# Patient Record
Sex: FEMALE | Race: WHITE | NOT HISPANIC OR LATINO | Employment: FULL TIME | URBAN - METROPOLITAN AREA
[De-identification: names, ages, dates, MRNs, and addresses within clinical notes are randomized per-mention and may not be internally consistent; named-entity substitution may affect disease eponyms.]

---

## 2023-04-14 ENCOUNTER — APPOINTMENT (OUTPATIENT)
Dept: PRIMARY CARE | Facility: CLINIC | Age: 63
End: 2023-04-14
Payer: COMMERCIAL

## 2023-05-01 RX ORDER — CALCIUM CARBONATE/VITAMIN D3 600MG-5MCG
1 TABLET ORAL DAILY
COMMUNITY
Start: 2019-10-25

## 2023-05-01 RX ORDER — DICLOFENAC SODIUM 10 MG/G
GEL TOPICAL
COMMUNITY
Start: 2020-12-14 | End: 2023-05-02 | Stop reason: ALTCHOICE

## 2023-05-01 RX ORDER — AMITRIPTYLINE HYDROCHLORIDE 25 MG/1
1 TABLET, FILM COATED ORAL NIGHTLY
COMMUNITY
Start: 2022-01-06 | End: 2023-05-02 | Stop reason: SDUPTHER

## 2023-05-01 RX ORDER — CYCLOSPORINE 0.5 MG/ML
1 EMULSION OPHTHALMIC 2 TIMES DAILY
COMMUNITY
Start: 2019-01-29 | End: 2023-11-02 | Stop reason: WASHOUT

## 2023-05-01 RX ORDER — RAMIPRIL 10 MG/1
1 CAPSULE ORAL DAILY
COMMUNITY
Start: 2019-10-25 | End: 2023-05-02 | Stop reason: SDUPTHER

## 2023-05-01 RX ORDER — MULTIVITAMIN
1 TABLET ORAL DAILY
COMMUNITY
Start: 2019-10-25

## 2023-05-01 RX ORDER — MELOXICAM 15 MG/1
1 TABLET ORAL DAILY
COMMUNITY
Start: 2019-10-28 | End: 2023-05-02 | Stop reason: SDUPTHER

## 2023-05-01 RX ORDER — PAROXETINE HYDROCHLORIDE 20 MG/1
1 TABLET, FILM COATED ORAL DAILY
COMMUNITY
Start: 2019-10-25 | End: 2023-12-04 | Stop reason: SINTOL

## 2023-05-02 ENCOUNTER — OFFICE VISIT (OUTPATIENT)
Dept: PRIMARY CARE | Facility: CLINIC | Age: 63
End: 2023-05-02
Payer: COMMERCIAL

## 2023-05-02 VITALS
SYSTOLIC BLOOD PRESSURE: 118 MMHG | OXYGEN SATURATION: 96 % | WEIGHT: 181.4 LBS | HEIGHT: 61 IN | BODY MASS INDEX: 34.25 KG/M2 | TEMPERATURE: 97.2 F | HEART RATE: 79 BPM | DIASTOLIC BLOOD PRESSURE: 60 MMHG

## 2023-05-02 DIAGNOSIS — M54.16 LUMBAR RADICULOPATHY: ICD-10-CM

## 2023-05-02 DIAGNOSIS — M19.039 LOCALIZED PRIMARY OSTEOARTHRITIS OF WRIST, UNSPECIFIED LATERALITY: ICD-10-CM

## 2023-05-02 DIAGNOSIS — I10 ESSENTIAL HYPERTENSION, BENIGN: Primary | ICD-10-CM

## 2023-05-02 PROBLEM — M51.9 LUMBAR DISC DISEASE: Status: ACTIVE | Noted: 2023-05-02

## 2023-05-02 PROBLEM — H61.23 IMPACTED CERUMEN OF BOTH EARS: Status: ACTIVE | Noted: 2023-05-02

## 2023-05-02 PROBLEM — G56.03 BILATERAL CARPAL TUNNEL SYNDROME: Status: ACTIVE | Noted: 2023-05-02

## 2023-05-02 PROBLEM — M48.061 LUMBAR SPINAL STENOSIS: Status: ACTIVE | Noted: 2023-05-02

## 2023-05-02 PROCEDURE — 3078F DIAST BP <80 MM HG: CPT | Performed by: INTERNAL MEDICINE

## 2023-05-02 PROCEDURE — 99214 OFFICE O/P EST MOD 30 MIN: CPT | Performed by: INTERNAL MEDICINE

## 2023-05-02 PROCEDURE — 1036F TOBACCO NON-USER: CPT | Performed by: INTERNAL MEDICINE

## 2023-05-02 PROCEDURE — 3074F SYST BP LT 130 MM HG: CPT | Performed by: INTERNAL MEDICINE

## 2023-05-02 RX ORDER — AMITRIPTYLINE HYDROCHLORIDE 25 MG/1
25 TABLET, FILM COATED ORAL NIGHTLY
Qty: 90 TABLET | Refills: 1 | Status: SHIPPED | OUTPATIENT
Start: 2023-05-02 | End: 2024-05-02 | Stop reason: ALTCHOICE

## 2023-05-02 RX ORDER — RAMIPRIL 10 MG/1
10 CAPSULE ORAL DAILY
Qty: 90 CAPSULE | Refills: 1 | Status: SHIPPED | OUTPATIENT
Start: 2023-05-02 | End: 2023-11-02 | Stop reason: SDUPTHER

## 2023-05-02 RX ORDER — MELOXICAM 15 MG/1
15 TABLET ORAL DAILY
Qty: 90 TABLET | Refills: 1 | Status: SHIPPED | OUTPATIENT
Start: 2023-05-02 | End: 2023-11-02 | Stop reason: SDUPTHER

## 2023-05-02 ASSESSMENT — ENCOUNTER SYMPTOMS
ALLERGIC/IMMUNOLOGIC NEGATIVE: 1
CARDIOVASCULAR NEGATIVE: 1
RESPIRATORY NEGATIVE: 1
MUSCULOSKELETAL NEGATIVE: 1
CONSTITUTIONAL NEGATIVE: 1
GASTROINTESTINAL NEGATIVE: 1
HEMATOLOGIC/LYMPHATIC NEGATIVE: 1
PSYCHIATRIC NEGATIVE: 1
EYES NEGATIVE: 1
ENDOCRINE NEGATIVE: 1
NEUROLOGICAL NEGATIVE: 1

## 2023-05-02 NOTE — PROGRESS NOTES
"Subjective   Patient ID: Janet Santillan is a 62 y.o. female who presents for 6 month follow up .  Patient presents in follow up regarding hypertension.  Blood pressure has been well controlled on current therapy.  No adverse effects of medication reported.  Patient denies chest pain, palpitations, shortness of breath, orthopnea, fatigue or edema.         Review of Systems   Constitutional: Negative.    HENT: Negative.     Eyes: Negative.    Respiratory: Negative.     Cardiovascular: Negative.    Gastrointestinal: Negative.    Endocrine: Negative.    Genitourinary: Negative.    Musculoskeletal: Negative.    Skin: Negative.    Allergic/Immunologic: Negative.    Neurological: Negative.    Hematological: Negative.    Psychiatric/Behavioral: Negative.         Objective     Blood pressure 118/60, pulse 79, temperature 36.2 °C (97.2 °F), temperature source Temporal, height 1.549 m (5' 1\"), weight 82.3 kg (181 lb 6.4 oz), SpO2 96 %.   Physical Exam  Constitutional:       Appearance: Normal appearance.   Neck:      Vascular: No carotid bruit.   Cardiovascular:      Rate and Rhythm: Normal rate and regular rhythm.      Pulses: Normal pulses.      Heart sounds: Normal heart sounds. No murmur heard.  Pulmonary:      Effort: Pulmonary effort is normal.      Breath sounds: Normal breath sounds. No wheezing or rales.   Abdominal:      General: Abdomen is flat. There is no distension.      Palpations: Abdomen is soft.      Tenderness: There is no abdominal tenderness.   Musculoskeletal:         General: Normal range of motion.      Cervical back: Normal range of motion and neck supple.   Skin:     General: Skin is warm and dry.   Neurological:      General: No focal deficit present.      Mental Status: She is alert and oriented to person, place, and time.   Psychiatric:         Mood and Affect: Mood normal.         Behavior: Behavior normal.         Assessment/Plan   Problem List Items Addressed This Visit          Nervous    Lumbar " radiculopathy       Circulatory    Essential hypertension, benign - Primary       Musculoskeletal    Localized primary osteoarthritis of wrist     BP well controlled on current therapy.  Will continue present therapy and follow.  Arthritis pain well compensated on current therapy.  Will continue present therapy and follow.  It is noted that she underwent successful right carpal tunnel release on 1/24 and left carpal tunnel release on 2/14 per Dr. Baer and is doing well.  Radiculopathy Sx overall well compensated on current therapy.  Will continue present therapy and follow.  Lab done in 10/2022 is reviewed with no unsettling findings.  Will follow annually.  She also had mammogram in 11/2022 which was negative and DEXA scan which was normal.  She also had her colonoscopy done with finding of only 1 hyperplastic polyp.    Follow up in 6 months

## 2023-08-09 ENCOUNTER — TELEPHONE (OUTPATIENT)
Dept: PRIMARY CARE | Facility: CLINIC | Age: 63
End: 2023-08-09
Payer: COMMERCIAL

## 2023-08-09 NOTE — TELEPHONE ENCOUNTER
Patient called in and said she was experiencing hot flashes and she believes that the amitriptyline may be the cause of it. She was wondering if you would advise that she discontinue this medication or if she should take something else that is comparable as [facility] also has this medication on backorder.

## 2023-10-20 ENCOUNTER — TELEPHONE (OUTPATIENT)
Dept: PRIMARY CARE | Facility: CLINIC | Age: 63
End: 2023-10-20
Payer: COMMERCIAL

## 2023-10-20 DIAGNOSIS — Z13.6 SCREENING FOR CARDIOVASCULAR CONDITION: ICD-10-CM

## 2023-10-20 DIAGNOSIS — I10 ESSENTIAL HYPERTENSION, BENIGN: Primary | ICD-10-CM

## 2023-10-27 ENCOUNTER — LAB (OUTPATIENT)
Dept: LAB | Facility: LAB | Age: 63
End: 2023-10-27
Payer: COMMERCIAL

## 2023-10-27 DIAGNOSIS — Z13.6 SCREENING FOR CARDIOVASCULAR CONDITION: ICD-10-CM

## 2023-10-27 DIAGNOSIS — I10 ESSENTIAL HYPERTENSION, BENIGN: ICD-10-CM

## 2023-10-27 LAB
ALBUMIN SERPL BCP-MCNC: 4.3 G/DL (ref 3.4–5)
ALP SERPL-CCNC: 38 U/L (ref 33–136)
ALT SERPL W P-5'-P-CCNC: 12 U/L (ref 7–45)
ANION GAP SERPL CALC-SCNC: 9 MMOL/L (ref 10–20)
AST SERPL W P-5'-P-CCNC: 17 U/L (ref 9–39)
BASOPHILS # BLD AUTO: 0.04 X10*3/UL (ref 0–0.1)
BASOPHILS NFR BLD AUTO: 0.5 %
BILIRUB SERPL-MCNC: 0.3 MG/DL (ref 0–1.2)
BUN SERPL-MCNC: 27 MG/DL (ref 6–23)
CALCIUM SERPL-MCNC: 9.3 MG/DL (ref 8.6–10.3)
CHLORIDE SERPL-SCNC: 106 MMOL/L (ref 98–107)
CHOLEST SERPL-MCNC: 225 MG/DL (ref 0–199)
CHOLESTEROL/HDL RATIO: 5.2
CO2 SERPL-SCNC: 29 MMOL/L (ref 21–32)
CREAT SERPL-MCNC: 0.78 MG/DL (ref 0.5–1.05)
EOSINOPHIL # BLD AUTO: 0.19 X10*3/UL (ref 0–0.7)
EOSINOPHIL NFR BLD AUTO: 2.3 %
ERYTHROCYTE [DISTWIDTH] IN BLOOD BY AUTOMATED COUNT: 13.1 % (ref 11.5–14.5)
GFR SERPL CREATININE-BSD FRML MDRD: 85 ML/MIN/1.73M*2
GLUCOSE SERPL-MCNC: 97 MG/DL (ref 74–99)
HCT VFR BLD AUTO: 42.3 % (ref 36–46)
HDLC SERPL-MCNC: 43.4 MG/DL
HGB BLD-MCNC: 13.5 G/DL (ref 12–16)
IMM GRANULOCYTES # BLD AUTO: 0.03 X10*3/UL (ref 0–0.7)
IMM GRANULOCYTES NFR BLD AUTO: 0.4 % (ref 0–0.9)
LDLC SERPL CALC-MCNC: 121 MG/DL
LYMPHOCYTES # BLD AUTO: 3.75 X10*3/UL (ref 1.2–4.8)
LYMPHOCYTES NFR BLD AUTO: 45.3 %
MCH RBC QN AUTO: 27.4 PG (ref 26–34)
MCHC RBC AUTO-ENTMCNC: 31.9 G/DL (ref 32–36)
MCV RBC AUTO: 86 FL (ref 80–100)
MONOCYTES # BLD AUTO: 0.63 X10*3/UL (ref 0.1–1)
MONOCYTES NFR BLD AUTO: 7.6 %
NEUTROPHILS # BLD AUTO: 3.64 X10*3/UL (ref 1.2–7.7)
NEUTROPHILS NFR BLD AUTO: 43.9 %
NON HDL CHOLESTEROL: 182 MG/DL (ref 0–149)
NRBC BLD-RTO: 0 /100 WBCS (ref 0–0)
PLATELET # BLD AUTO: 210 X10*3/UL (ref 150–450)
PMV BLD AUTO: 10.6 FL (ref 7.5–11.5)
POTASSIUM SERPL-SCNC: 4.3 MMOL/L (ref 3.5–5.3)
PROT SERPL-MCNC: 6.8 G/DL (ref 6.4–8.2)
RBC # BLD AUTO: 4.92 X10*6/UL (ref 4–5.2)
SODIUM SERPL-SCNC: 140 MMOL/L (ref 136–145)
TRIGL SERPL-MCNC: 304 MG/DL (ref 0–149)
VLDL: 61 MG/DL (ref 0–40)
WBC # BLD AUTO: 8.3 X10*3/UL (ref 4.4–11.3)

## 2023-10-27 PROCEDURE — 85025 COMPLETE CBC W/AUTO DIFF WBC: CPT

## 2023-10-27 PROCEDURE — 36415 COLL VENOUS BLD VENIPUNCTURE: CPT

## 2023-10-27 PROCEDURE — 80061 LIPID PANEL: CPT

## 2023-10-27 PROCEDURE — 80053 COMPREHEN METABOLIC PANEL: CPT

## 2023-11-02 ENCOUNTER — OFFICE VISIT (OUTPATIENT)
Dept: PRIMARY CARE | Facility: CLINIC | Age: 63
End: 2023-11-02
Payer: COMMERCIAL

## 2023-11-02 VITALS
TEMPERATURE: 96.9 F | HEIGHT: 61 IN | SYSTOLIC BLOOD PRESSURE: 120 MMHG | DIASTOLIC BLOOD PRESSURE: 62 MMHG | HEART RATE: 77 BPM | OXYGEN SATURATION: 95 % | WEIGHT: 184.6 LBS | BODY MASS INDEX: 34.85 KG/M2

## 2023-11-02 DIAGNOSIS — M19.039 LOCALIZED PRIMARY OSTEOARTHRITIS OF WRIST, UNSPECIFIED LATERALITY: ICD-10-CM

## 2023-11-02 DIAGNOSIS — Z12.31 SCREENING MAMMOGRAM FOR BREAST CANCER: ICD-10-CM

## 2023-11-02 DIAGNOSIS — E66.09 CLASS 1 OBESITY DUE TO EXCESS CALORIES WITH SERIOUS COMORBIDITY AND BODY MASS INDEX (BMI) OF 34.0 TO 34.9 IN ADULT: ICD-10-CM

## 2023-11-02 DIAGNOSIS — I10 ESSENTIAL HYPERTENSION, BENIGN: Primary | ICD-10-CM

## 2023-11-02 DIAGNOSIS — M54.16 LUMBAR RADICULOPATHY: ICD-10-CM

## 2023-11-02 PROCEDURE — 3074F SYST BP LT 130 MM HG: CPT | Performed by: INTERNAL MEDICINE

## 2023-11-02 PROCEDURE — 99214 OFFICE O/P EST MOD 30 MIN: CPT | Performed by: INTERNAL MEDICINE

## 2023-11-02 PROCEDURE — 3008F BODY MASS INDEX DOCD: CPT | Performed by: INTERNAL MEDICINE

## 2023-11-02 PROCEDURE — 3078F DIAST BP <80 MM HG: CPT | Performed by: INTERNAL MEDICINE

## 2023-11-02 PROCEDURE — 1036F TOBACCO NON-USER: CPT | Performed by: INTERNAL MEDICINE

## 2023-11-02 RX ORDER — RAMIPRIL 10 MG/1
10 CAPSULE ORAL DAILY
Qty: 90 CAPSULE | Refills: 1 | Status: SHIPPED | OUTPATIENT
Start: 2023-11-02 | End: 2024-05-02 | Stop reason: SDUPTHER

## 2023-11-02 RX ORDER — MELOXICAM 15 MG/1
15 TABLET ORAL DAILY
Qty: 90 TABLET | Refills: 1 | Status: SHIPPED | OUTPATIENT
Start: 2023-11-02 | End: 2024-05-02 | Stop reason: SDUPTHER

## 2023-11-02 ASSESSMENT — ENCOUNTER SYMPTOMS
RESPIRATORY NEGATIVE: 1
ENDOCRINE NEGATIVE: 1
MUSCULOSKELETAL NEGATIVE: 1
NEUROLOGICAL NEGATIVE: 1
EYES NEGATIVE: 1
PSYCHIATRIC NEGATIVE: 1
CONSTITUTIONAL NEGATIVE: 1
HEMATOLOGIC/LYMPHATIC NEGATIVE: 1
CARDIOVASCULAR NEGATIVE: 1
GASTROINTESTINAL NEGATIVE: 1
ALLERGIC/IMMUNOLOGIC NEGATIVE: 1

## 2023-11-02 NOTE — PROGRESS NOTES
"Subjective   Patient ID: Janet Santillan is a 63 y.o. female who presents for 6 month follow up .  Patient presents in follow up regarding hypertension.  Blood pressure has been well controlled on current therapy.  No adverse effects of medication reported.  Patient denies chest pain, palpitations, shortness of breath, orthopnea, fatigue or edema.         Review of Systems   Constitutional: Negative.    HENT: Negative.     Eyes: Negative.    Respiratory: Negative.     Cardiovascular: Negative.    Gastrointestinal: Negative.    Endocrine: Negative.    Genitourinary: Negative.    Musculoskeletal: Negative.    Skin: Negative.    Allergic/Immunologic: Negative.    Neurological: Negative.    Hematological: Negative.    Psychiatric/Behavioral: Negative.         Objective     Blood pressure 120/62, pulse 77, temperature 36.1 °C (96.9 °F), temperature source Temporal, height 1.549 m (5' 1\"), weight 83.7 kg (184 lb 9.6 oz), SpO2 95 %.   Physical Exam  Constitutional:       Appearance: Normal appearance.   Neck:      Vascular: No carotid bruit.   Cardiovascular:      Rate and Rhythm: Normal rate and regular rhythm.      Pulses: Normal pulses.      Heart sounds: Normal heart sounds. No murmur heard.  Pulmonary:      Effort: Pulmonary effort is normal.      Breath sounds: Normal breath sounds. No wheezing or rales.   Abdominal:      General: Abdomen is flat. There is no distension.      Palpations: Abdomen is soft.      Tenderness: There is no abdominal tenderness.   Musculoskeletal:         General: Normal range of motion.      Cervical back: Normal range of motion and neck supple.   Skin:     General: Skin is warm and dry.   Neurological:      General: No focal deficit present.      Mental Status: She is alert and oriented to person, place, and time.   Psychiatric:         Mood and Affect: Mood normal.         Behavior: Behavior normal.         Assessment/Plan   Problem List Items Addressed This Visit       Essential " hypertension, benign - Primary    Relevant Medications    ramipril (Altace) 10 mg capsule    Localized primary osteoarthritis of wrist    Relevant Medications    meloxicam (Mobic) 15 mg tablet    Lumbar radiculopathy    Relevant Medications    meloxicam (Mobic) 15 mg tablet     Other Visit Diagnoses       Screening mammogram for breast cancer        Relevant Orders    BI mammo bilateral screening tomosynthesis    Class 1 obesity due to excess calories with serious comorbidity and body mass index (BMI) of 34.0 to 34.9 in adult              BP well controlled on current therapy.  Will continue present therapy and follow.  Arthritis pain well compensated on current therapy.  Will continue present therapy and follow.  It is noted that she underwent successful right carpal tunnel release on 1/24 and left carpal tunnel release on 2/14 per Dr. Baer and is doing well.  Radiculopathy Sx overall well compensated on current therapy.  She states she doesn't feel the amitriptyline or the paroxetine are necessary at this point for her hot flashes, so she is going to try to go off of them.  Refill not done today and will follow clinically.  It is noted that patient has gained 10# over the past 9 months.  She advised on improving dietary choices and portion control as well as increasing exercise to promote weight loss.   Lab recently done is reviewed with no unsettling findings.  Will follow annually.  She is due for annual mammogram this month.  Order is entered.  She has had colonoscopy done with finding of only 1 hyperplastic polyp.    Follow up in 6 months

## 2023-11-09 ENCOUNTER — HOSPITAL ENCOUNTER (OUTPATIENT)
Dept: RADIOLOGY | Facility: HOSPITAL | Age: 63
Discharge: HOME | End: 2023-11-09
Payer: COMMERCIAL

## 2023-11-09 DIAGNOSIS — Z12.31 SCREENING MAMMOGRAM FOR BREAST CANCER: ICD-10-CM

## 2023-11-09 PROCEDURE — 77067 SCR MAMMO BI INCL CAD: CPT | Performed by: RADIOLOGY

## 2023-11-09 PROCEDURE — 77063 BREAST TOMOSYNTHESIS BI: CPT

## 2023-11-09 PROCEDURE — 77063 BREAST TOMOSYNTHESIS BI: CPT | Performed by: RADIOLOGY

## 2023-12-04 ENCOUNTER — OFFICE VISIT (OUTPATIENT)
Dept: OBSTETRICS AND GYNECOLOGY | Facility: CLINIC | Age: 63
End: 2023-12-04
Payer: COMMERCIAL

## 2023-12-04 VITALS
HEIGHT: 61 IN | SYSTOLIC BLOOD PRESSURE: 144 MMHG | DIASTOLIC BLOOD PRESSURE: 88 MMHG | WEIGHT: 185 LBS | BODY MASS INDEX: 34.93 KG/M2

## 2023-12-04 DIAGNOSIS — Z01.419 WOMEN'S ANNUAL ROUTINE GYNECOLOGICAL EXAMINATION: Primary | ICD-10-CM

## 2023-12-04 PROCEDURE — 1036F TOBACCO NON-USER: CPT | Performed by: NURSE PRACTITIONER

## 2023-12-04 PROCEDURE — 3077F SYST BP >= 140 MM HG: CPT | Performed by: NURSE PRACTITIONER

## 2023-12-04 PROCEDURE — 3079F DIAST BP 80-89 MM HG: CPT | Performed by: NURSE PRACTITIONER

## 2023-12-04 PROCEDURE — 99396 PREV VISIT EST AGE 40-64: CPT | Performed by: NURSE PRACTITIONER

## 2023-12-04 PROCEDURE — 3008F BODY MASS INDEX DOCD: CPT | Performed by: NURSE PRACTITIONER

## 2023-12-04 ASSESSMENT — ENCOUNTER SYMPTOMS
RESPIRATORY NEGATIVE: 1
MUSCULOSKELETAL NEGATIVE: 1
CARDIOVASCULAR NEGATIVE: 1
EYES NEGATIVE: 1
PSYCHIATRIC NEGATIVE: 1
NEUROLOGICAL NEGATIVE: 1
CONSTITUTIONAL NEGATIVE: 1
GASTROINTESTINAL NEGATIVE: 1
ENDOCRINE NEGATIVE: 1

## 2024-05-02 ENCOUNTER — OFFICE VISIT (OUTPATIENT)
Dept: PRIMARY CARE | Facility: CLINIC | Age: 64
End: 2024-05-02
Payer: COMMERCIAL

## 2024-05-02 VITALS
DIASTOLIC BLOOD PRESSURE: 58 MMHG | OXYGEN SATURATION: 98 % | TEMPERATURE: 96.4 F | HEART RATE: 54 BPM | BODY MASS INDEX: 33.48 KG/M2 | WEIGHT: 177.2 LBS | SYSTOLIC BLOOD PRESSURE: 130 MMHG

## 2024-05-02 DIAGNOSIS — M19.039 LOCALIZED PRIMARY OSTEOARTHRITIS OF WRIST, UNSPECIFIED LATERALITY: ICD-10-CM

## 2024-05-02 DIAGNOSIS — M54.16 LUMBAR RADICULOPATHY: ICD-10-CM

## 2024-05-02 DIAGNOSIS — Z98.890 S/P LUMBAR DISCECTOMY: ICD-10-CM

## 2024-05-02 DIAGNOSIS — Z13.6 SCREENING FOR CARDIOVASCULAR CONDITION: ICD-10-CM

## 2024-05-02 DIAGNOSIS — I10 ESSENTIAL HYPERTENSION, BENIGN: Primary | ICD-10-CM

## 2024-05-02 PROCEDURE — 99214 OFFICE O/P EST MOD 30 MIN: CPT | Performed by: INTERNAL MEDICINE

## 2024-05-02 PROCEDURE — G2211 COMPLEX E/M VISIT ADD ON: HCPCS | Performed by: INTERNAL MEDICINE

## 2024-05-02 PROCEDURE — 3008F BODY MASS INDEX DOCD: CPT | Performed by: INTERNAL MEDICINE

## 2024-05-02 PROCEDURE — 3075F SYST BP GE 130 - 139MM HG: CPT | Performed by: INTERNAL MEDICINE

## 2024-05-02 PROCEDURE — 3078F DIAST BP <80 MM HG: CPT | Performed by: INTERNAL MEDICINE

## 2024-05-02 RX ORDER — RAMIPRIL 10 MG/1
10 CAPSULE ORAL DAILY
Qty: 90 CAPSULE | Refills: 1 | Status: SHIPPED | OUTPATIENT
Start: 2024-05-02

## 2024-05-02 RX ORDER — MELOXICAM 15 MG/1
15 TABLET ORAL DAILY
Qty: 90 TABLET | Refills: 1 | Status: SHIPPED | OUTPATIENT
Start: 2024-05-02

## 2024-05-02 RX ORDER — PREDNISONE 10 MG/1
TABLET ORAL DAILY
Qty: 19 TABLET | Refills: 0 | Status: SHIPPED | OUTPATIENT
Start: 2024-05-02 | End: 2024-05-13

## 2024-05-02 ASSESSMENT — ENCOUNTER SYMPTOMS
CONSTITUTIONAL NEGATIVE: 1
EYES NEGATIVE: 1
PSYCHIATRIC NEGATIVE: 1
NEUROLOGICAL NEGATIVE: 1
CARDIOVASCULAR NEGATIVE: 1
ENDOCRINE NEGATIVE: 1
ALLERGIC/IMMUNOLOGIC NEGATIVE: 1
GASTROINTESTINAL NEGATIVE: 1
RESPIRATORY NEGATIVE: 1
MUSCULOSKELETAL NEGATIVE: 1
HEMATOLOGIC/LYMPHATIC NEGATIVE: 1

## 2024-05-02 NOTE — PROGRESS NOTES
Subjective   Patient ID: Janet Santillan is a 63 y.o. female who presents for 6 month follow up .  Patient presents in follow up regarding hypertension.  Blood pressure has been well controlled on current therapy.  No adverse effects of medication reported.  Patient denies chest pain, palpitations, shortness of breath, orthopnea, fatigue or edema.         Review of Systems   Constitutional: Negative.    HENT: Negative.     Eyes: Negative.    Respiratory: Negative.     Cardiovascular: Negative.    Gastrointestinal: Negative.    Endocrine: Negative.    Genitourinary: Negative.    Musculoskeletal: Negative.    Skin: Negative.    Allergic/Immunologic: Negative.    Neurological: Negative.    Hematological: Negative.    Psychiatric/Behavioral: Negative.         Objective     Blood pressure 130/58, pulse 54, temperature 35.8 °C (96.4 °F), temperature source Temporal, weight 80.4 kg (177 lb 3.2 oz), SpO2 98%.   Physical Exam  Constitutional:       Appearance: Normal appearance.   Neck:      Vascular: No carotid bruit.   Cardiovascular:      Rate and Rhythm: Normal rate and regular rhythm.      Pulses: Normal pulses.      Heart sounds: Normal heart sounds. No murmur heard.  Pulmonary:      Effort: Pulmonary effort is normal.      Breath sounds: Normal breath sounds. No wheezing or rales.   Abdominal:      General: Abdomen is flat. There is no distension.      Palpations: Abdomen is soft.      Tenderness: There is no abdominal tenderness.   Musculoskeletal:         General: Normal range of motion.      Cervical back: Normal range of motion and neck supple.   Skin:     General: Skin is warm and dry.   Neurological:      General: No focal deficit present.      Mental Status: She is alert and oriented to person, place, and time.   Psychiatric:         Mood and Affect: Mood normal.         Behavior: Behavior normal.         Assessment/Plan   Problem List Items Addressed This Visit       Essential hypertension, benign - Primary     Relevant Medications    ramipril (Altace) 10 mg capsule    Other Relevant Orders    Comprehensive Metabolic Panel    CBC and Auto Differential    Localized primary osteoarthritis of wrist    Relevant Medications    meloxicam (Mobic) 15 mg tablet    Lumbar radiculopathy    Relevant Medications    meloxicam (Mobic) 15 mg tablet    predniSONE (Deltasone) 10 mg tablet    Other Relevant Orders    Referral to Orthopaedic Surgery     Other Visit Diagnoses       Screening for cardiovascular condition        Relevant Orders    Lipid Panel    S/P lumbar discectomy        Relevant Orders    Referral to Orthopaedic Surgery          BP well controlled on current therapy.  Will continue present therapy and follow.  Arthritis pain well compensated on current therapy.  Will continue present therapy and follow.  It is noted that she underwent successful right carpal tunnel release on 1/24 and left carpal tunnel release on 2/14 per Dr. Baer and is doing well.  Radiculopathy Sx in the right lower lateral leg and foot have been worsening of late.  She is remotely s/p right L5 hemilaminectomy and L4-5 discectomy.  Will attempt to reduce inflammation with steroid therapy and will refer to Dr. Elaine.  She has remote x-rrays and MR so will defer ordering any radiographs to Dr. Elaine.  She states she didn't feel the amitriptyline or the paroxetine were necessary at this point for her hot flashes, so she has stopped both over time and continues to do well without.  It is noted that patient has lost 8# over the past 6 months.  She is encouraged and advised to continue improving dietary choices and portion control as well as increasing exercise to promote weight loss.   Annual mammogram in November was negative.  Will follow annually.  She has had colonoscopy done with finding of only 1 hyperplastic polyp.  Annual screening lab will be ordered for prior to next OV.    Follow up in 6 months   Lab to be drawn 1 week prior to next office  visit.

## 2024-05-16 ENCOUNTER — TRANSCRIBE ORDERS (OUTPATIENT)
Dept: ORTHOPEDIC SURGERY | Facility: HOSPITAL | Age: 64
End: 2024-05-16
Payer: COMMERCIAL

## 2024-05-16 DIAGNOSIS — M54.50 LOW BACK PAIN, UNSPECIFIED BACK PAIN LATERALITY, UNSPECIFIED CHRONICITY, UNSPECIFIED WHETHER SCIATICA PRESENT: ICD-10-CM

## 2024-05-21 ENCOUNTER — OFFICE VISIT (OUTPATIENT)
Dept: ORTHOPEDIC SURGERY | Facility: CLINIC | Age: 64
End: 2024-05-21
Payer: COMMERCIAL

## 2024-05-21 VITALS — HEIGHT: 61 IN | BODY MASS INDEX: 33.42 KG/M2 | WEIGHT: 177 LBS

## 2024-05-21 DIAGNOSIS — Z98.890 S/P LUMBAR DISCECTOMY: ICD-10-CM

## 2024-05-21 DIAGNOSIS — M54.16 LUMBAR RADICULOPATHY: ICD-10-CM

## 2024-05-21 PROCEDURE — 99213 OFFICE O/P EST LOW 20 MIN: CPT | Performed by: ORTHOPAEDIC SURGERY

## 2024-05-21 PROCEDURE — 99203 OFFICE O/P NEW LOW 30 MIN: CPT | Performed by: ORTHOPAEDIC SURGERY

## 2024-05-21 PROCEDURE — 3008F BODY MASS INDEX DOCD: CPT | Performed by: ORTHOPAEDIC SURGERY

## 2024-05-21 ASSESSMENT — PAIN - FUNCTIONAL ASSESSMENT: PAIN_FUNCTIONAL_ASSESSMENT: 0-10

## 2024-05-21 NOTE — LETTER
May 22, 2024     Rylan Salcedo MD  [address]  [facility], [address] [place] [id number]    Patient: Janet Santillan   YOB: 1960   Date of Visit: 5/21/2024       Dear Dr. Rylan Salcedo MD:    Thank you for referring Janet Santillan to me for evaluation. Below are my notes for this consultation.  If you have questions, please do not hesitate to call me. I look forward to following your patient along with you.       Sincerely,     Mamadou Elaine MD      CC: No Recipients  ______________________________________________________________________________________    HPI:Janet Santillan is a 63-year-old woman who comes in today with complaints of chronic right-sided back pain and leg pain.  She describes it as a throbbing pain.  There is associated numbness and tingling.  She has not had physical therapy.  Pain is rather constant.      ROS:  Reviewed on EMR and patient intake sheet.    PMH/SH:  Reviewed on EMR and patient intake sheet.    Exam:  Physical Exam    Constitutional: Well appearing; no acute distress  Eyes: pupils are equal and round  Psych: normal affect  Respiratory: non-labored breathing  Cardiovascular: regular rate and rhythm  GI: non-distended abdomen  Musculoskeletal: no pain with range of motion of the hips bilaterally  Neurologic: [4]/5 strength in the lower extremities bilaterally]; [negative] straight leg raise    Radiology:     MRI in 2021 demonstrates disc bulging L4-5 L5-S1.  Evidence of prior right 5 S1 hemilaminectomy is noted.    Diagnosis:    Lumbar radiculopathy    Assessment and Plan:   63-year-old woman with lumbar radiculopathy.  Will begin with physical therapy.  If the symptoms do not improve, then she would need an MRI and follow-up.    The patient was in agreement with the plan. At the end of the visit today, the patient felt that all questions had been answered satisfactorily.  The patient was pleased with the visit and very appreciative for the  care rendered.     Thank you very much for the kind referral.  It is a privilege, and a pleasure, to partner with you in the care of your patients.  I would be delighted to assist you with any further consultations as needed.          Mamadou Elaine MD    Chief of Spine Surgery, [facility]  Director of Spine Service, [facility]  , Department of Orthopaedics  [employer]  [address] [id number]  P: [phone]  [facility]Lost My Name    This note was dictated with voice recognition software.  It has not been proofread for grammatical errors, typographical mistakes or other semantic inconsistencies.

## 2024-05-22 NOTE — PROGRESS NOTES
HPI:Janet Santillan is a 63-year-old woman who comes in today with complaints of chronic right-sided back pain and leg pain.  She describes it as a throbbing pain.  There is associated numbness and tingling.  She has not had physical therapy.  Pain is rather constant.      ROS:  Reviewed on EMR and patient intake sheet.    PMH/SH:  Reviewed on EMR and patient intake sheet.    Exam:  Physical Exam    Constitutional: Well appearing; no acute distress  Eyes: pupils are equal and round  Psych: normal affect  Respiratory: non-labored breathing  Cardiovascular: regular rate and rhythm  GI: non-distended abdomen  Musculoskeletal: no pain with range of motion of the hips bilaterally  Neurologic: [4]/5 strength in the lower extremities bilaterally]; [negative] straight leg raise    Radiology:     MRI in 2021 demonstrates disc bulging L4-5 L5-S1.  Evidence of prior right 5 S1 hemilaminectomy is noted.    Diagnosis:    Lumbar radiculopathy    Assessment and Plan:   63-year-old woman with lumbar radiculopathy.  Will begin with physical therapy.  If the symptoms do not improve, then she would need an MRI and follow-up.    The patient was in agreement with the plan. At the end of the visit today, the patient felt that all questions had been answered satisfactorily.  The patient was pleased with the visit and very appreciative for the care rendered.     Thank you very much for the kind referral.  It is a privilege, and a pleasure, to partner with you in the care of your patients.  I would be delighted to assist you with any further consultations as needed.          Mamadou Elaine MD    Chief of Spine Surgery, [facility]  Director of Spine Service, [facility]  , Department of Orthopaedics  [facility] [employer]  [address] [place] [address]  P: [phone]  [facility]    This note was dictated with  voice recognition software.  It has not been proofread for grammatical errors, typographical mistakes or other semantic inconsistencies.

## 2024-06-04 ENCOUNTER — EVALUATION (OUTPATIENT)
Dept: PHYSICAL THERAPY | Facility: HOSPITAL | Age: 64
End: 2024-06-04
Payer: COMMERCIAL

## 2024-06-04 DIAGNOSIS — R29.898 WEAKNESS OF RIGHT LEG: ICD-10-CM

## 2024-06-04 DIAGNOSIS — M54.16 LUMBAR RADICULOPATHY: Primary | ICD-10-CM

## 2024-06-04 DIAGNOSIS — M62.81 TRUNCAL MUSCLE WEAKNESS: ICD-10-CM

## 2024-06-04 PROCEDURE — 97162 PT EVAL MOD COMPLEX 30 MIN: CPT | Mod: GP | Performed by: PHYSICAL THERAPIST

## 2024-06-04 PROCEDURE — 97110 THERAPEUTIC EXERCISES: CPT | Mod: GP | Performed by: PHYSICAL THERAPIST

## 2024-06-04 ASSESSMENT — PAIN SCALES - GENERAL: PAINLEVEL_OUTOF10: 3

## 2024-06-04 ASSESSMENT — ENCOUNTER SYMPTOMS
LOSS OF SENSATION IN FEET: 1
OCCASIONAL FEELINGS OF UNSTEADINESS: 0
DEPRESSION: 0

## 2024-06-04 ASSESSMENT — PAIN DESCRIPTION - DESCRIPTORS: DESCRIPTORS: CRAMPING;SHOOTING;SPASM

## 2024-06-04 ASSESSMENT — PAIN - FUNCTIONAL ASSESSMENT: PAIN_FUNCTIONAL_ASSESSMENT: 0-10

## 2024-06-04 NOTE — PROGRESS NOTES
Physical Therapy    Physical Therapy Evaluation and Treatment      Patient Name: Janet Santillan  MRN: [id number]  Today's Date: 6/4/2024  Time Calculation  Start Time: 0900  Stop Time: 0950  Time Calculation (min): 50 min  Visit #1 ( auth requested)    Assessment:      Lumbar radiculopathy  Rt LE, constant 3/10 , ranges to 8/10 . Rt lower leg , cramping in toes, LBP Rt.  Chronic 2020, hx of spine surgery 2011,  hopes to get MRI, needs to do PT first. Works 4  12 hour days and so has only 1 day a week available to schedule PT. Some fear of falling , no fallls, the Rt leg is weak and cramps up, numb toes. Pt has core weakness and does not know how to engage abdominals, she has Rt Leg weakness and ankle and toe weakness, gait abnormality .       Plan:  OP PT Plan  Treatment/Interventions: Cryotherapy, Education/ Instruction, Manual therapy, Neuromuscular re-education, Therapeutic activities, Therapeutic exercises  PT Plan: Skilled PT  PT Frequency: 1 time per week (pt needs 1 x week due to her work schedule)  Duration: 6 weeks  Onset Date: 01/01/24 (chronic progressive since 2020)  Certification Period Start Date: 06/04/24  Certification Period End Date: 09/04/24  Number of Treatments Authorized: auth requested  Rehab Potential: Fair  Plan of Care Agreement: Patient    Current Problem:   1. Lumbar radiculopathy  Referral to Physical Therapy      2. Weakness of right leg        3. Truncal muscle weakness            Subjective   Lumbar radiculopathy  Rt LE, constant 3/10 , ranges to 8/10 . Rt lower leg , cramping in toes, LBP Rt.  Chronic 2020, hx of spine surgery 2011,  hopes to get MRI, needs to do PT first. Works 4  12 hour days and so has only 1 day a week available to schedule PT. Some fear of falling , no fallls, the Rt leg is weak and cramps up, numb toes    General:  General  Reason for Referral: Chele  Referred By: Lumbar radiculopathy  Past Medical History Relevant to Rehab: hx of 2011 lumbar spine surgery,  since that time 2021 shows disc bulging L4-5, L5-S1  Precautions:  Precautions  STEADI Fall Risk Score (The score of 4 or more indicates an increased risk of falling): 2 ( no falls but fears fall due to  rt leg weakness and pain, numbness       Pain:  Pain Assessment  Pain Assessment: 0-10  Pain Score: 3 (3-8/10)  Pain Type: Chronic pain  Pain Location: Back  Pain Orientation: Right  Pain Radiating Towards: rt foot / toes and lower leg rt.  Pain Descriptors: Cramping, Shooting, Spasm  Pain Frequency: Constant/continuous (cramping at night. numbness constant.)  Pain Onset: Progressive (2020)  Clinical Progression: Gradually worsening          Objective        General Assessments:  Janet Santillan is a 63-year-old woman who comes in today with complaints of chronic right-sided back pain and leg pain.  She describes it as a throbbing pain.  There is associated numbness and tingling.  Pain is rather constant. Main complaint is Rt lower leg cramping and spams across ankle and into toes. Numbness in Rt foot toes.  Pain since 2020.  Hopes to have MRI but needs PT first per insuranance    MRI in 2021 demonstrates disc bulging L4-5 L5-S1.  Evidence of prior right 5 S1 hemilaminectomy is noted.  Hx of Lumbar surgery 2011 . 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    Extremity/Trunk Assessments:    Rt LE weakness     MMT  Hip flex Rt 4-/5   left 5/5  Knee ext Rt 3/5  left 5/5  Knee flex Rt 4-/5   left 5/5  Ankle DF Rt 3/5   left 5/5  Ankle IN / EV Rt 3/5  left 5/5  Toe weakness 3/5 Rt left 5/5    Trunk AROM   Flex reach knees ( pain)  Ext 10' past neutral  Side bend left pain and limited  Side bend Rt WNL  Figure 4 tight Rt hip     +TTP Rt Low back - mid back   advise to ice     HS Rt 55    Gait antalgic, decreased wt bear time Rt  LE    Bed mobility with difficulty, pain , does not  know Log roll technique  Prone 30 sec but could not tolerate, increased cramping in foot / toes    Pt works 4 12 hour days and coming to PT will need to be on her day off 1 x in the week        Outcome Measures:  Oswestry 24%       Treatments:  EXERCISES       Date       VISIT# # # # #    REPS REPS REPS REPS          Core abdominal training  Pelvic tilt hooklying  Pelvic tilt with movement of LE  Pelvic rocking in seated  Pelvic tilt seated  Standing and engage core with BTW              LE ex standing with core engaged              Scapular rows  Pull downs              Prone lying                             Log roll bed mobility training              Posture educate       HEP  (    Build)          Educate pt in core engagement of abdominals, needs cues  Was able to learn supine but difficulty in seated , attempt seated pelvic rocking  unable to engage core in  standing    Educate in prone lying position for 30 sec but could not tolerate    Access Code: [email or web address]  URL: [email or web address]  Date: 06/04/2024  Prepared by: Kaia Chester    Exercises  - Supine Posterior Pelvic Tilt  - 1 x daily - 7 x weekly - 10 reps - 5 hold    EDUCATION:  Outpatient Education  Individual(s) Educated: Patient  Education Provided: POC, Home Exercise Program, Posture  Risk and Benefits Discussed with Patient/Caregiver/Other: yes  Plan of Care Discussed and Agreed Upon: yes  Patient Response to Education: Patient/Caregiver Verbalized Understanding of Information    Goals:  Active       PT Problem       PT Goal        Start:  06/04/24    Expected End:  06/25/24       1 Patient to be independent with home exercises within pain free range to stretch Lower extremities and to strengthen core abdominals and core stabilization to increase mobility  2 Patient to demonstrate understanding of what it means to have neutral posture alignment, the use of positioning strategies and body mechanics principles to reduce pain with  everyday activities.  3 Patient to gain the functional range of motion necessary in the  lumbar spine to perform activities of daily living with increased ease.              PT Goal        Start:  06/04/24    Expected End:  07/16/24       1 Patient to have reduced pain by 50% or more for increased function and mobility  2 Patient to demonstrate 1/3 MMT strength gain or more in targeted muscle groups and core strength for increased overall mobility  3 Patient to demonstrate a 4 point or more change in Oswestry to indicate reduced impairment with every day living   4 Pt to ambulate with normal gait, no longer antalgic, and for confidence walking to increase as it relates to Rt leg

## 2024-06-11 ENCOUNTER — APPOINTMENT (OUTPATIENT)
Dept: PHYSICAL THERAPY | Facility: HOSPITAL | Age: 64
End: 2024-06-11
Payer: COMMERCIAL

## 2024-06-12 ENCOUNTER — TREATMENT (OUTPATIENT)
Dept: PHYSICAL THERAPY | Facility: HOSPITAL | Age: 64
End: 2024-06-12
Payer: COMMERCIAL

## 2024-06-12 DIAGNOSIS — M54.16 LUMBAR RADICULOPATHY: ICD-10-CM

## 2024-06-12 DIAGNOSIS — R29.898 WEAKNESS OF RIGHT LEG: ICD-10-CM

## 2024-06-12 DIAGNOSIS — M62.81 TRUNCAL MUSCLE WEAKNESS: ICD-10-CM

## 2024-06-12 PROCEDURE — 97110 THERAPEUTIC EXERCISES: CPT | Mod: GP,CQ

## 2024-06-12 ASSESSMENT — PAIN SCALES - GENERAL: PAINLEVEL_OUTOF10: 3

## 2024-06-12 ASSESSMENT — PAIN - FUNCTIONAL ASSESSMENT: PAIN_FUNCTIONAL_ASSESSMENT: 0-10

## 2024-06-12 NOTE — PROGRESS NOTES
Physical Therapy    Physical Therapy Treatment    Patient Name: Janet Santillan  MRN: [id number]  Today's Date: 6/12/2024  Time Calculation  Start Time: 0745  Stop Time: 0830  Time Calculation (min): 45 min    PT Therapeutic Procedures Time Entry  Therapeutic Exercise Time Entry: 45          VISIT:# 2  1 EVAL AND 20 VISITS 6/4 TO 9/30    Current Problem  1. Lumbar radiculopathy  Follow Up In Physical Therapy      2. Weakness of right leg  Follow Up In Physical Therapy      3. Truncal muscle weakness  Follow Up In Physical Therapy          Subjective   Pt reports her leg is a little sore from doing the butt exercises        Precautions  Precautions  STEADI Fall Risk Score (The score of 4 or more indicates an increased risk of falling): 2  Precautions Comment: none       Pain  Pain Assessment: 0-10  Pain Score: 3  Pain Type: Chronic pain  Pain Location: Back       Objective    Started land based therapy         Treatments:  EXERCISES        Date        VISIT#  #2/20 # # #     REPS REPS REPS REPS   Nustep  10' @L1      Core abdominal training  Pelvic tilt hooklying  Pelvic tilt with movement of LE  Pelvic rocking in seated  Pelvic tilt seated  Standing and engage core with BTW            10x              LE ex standing with core engaged NEW   3 way kick 2 x 10 ea      HS curls  2 x 10       Scapular rows  Pull downs  2 x 10 red  2 x 10 red              Prone lying   PPU  X  5x                              Log roll bed mobility training                Posture educate        HEP (    Build)             Assessment:   Prone lying and PPU did not effect the pain n/t in pt's leg and foot.  Pt was educated on how to engage her core muscles.         Plan: Pt will continue her exercises at home.  We will advance as we are able   OP PT Plan  Treatment/Interventions: Cryotherapy, Education/ Instruction, Manual therapy, Neuromuscular re-education, Therapeutic activities, Therapeutic exercises  PT Plan: Skilled PT  PT Frequency: 1 time  per week (pt needs 1 x week due to her work schedule)  Duration: 6 weeks  Onset Date: 01/01/24 (chronic progressive since 2020)  Certification Period Start Date: 06/04/24  Certification Period End Date: 09/04/24  Number of Treatments Authorized: auth requested  Rehab Potential: Fair  Plan of Care Agreement: Patient    Goals:  Active       PT Problem       PT Goal        Start:  06/04/24    Expected End:  06/25/24       1 Patient to be independent with home exercises within pain free range to stretch Lower extremities and to strengthen core abdominals and core stabilization to increase mobility  2 Patient to demonstrate understanding of what it means to have neutral posture alignment, the use of positioning strategies and body mechanics principles to reduce pain with everyday activities.  3 Patient to gain the functional range of motion necessary in the  lumbar spine to perform activities of daily living with increased ease.              PT Goal        Start:  06/04/24    Expected End:  07/16/24       1 Patient to have reduced pain by 50% or more for increased function and mobility  2 Patient to demonstrate 1/3 MMT strength gain or more in targeted muscle groups and core strength for increased overall mobility  3 Patient to demonstrate a 4 point or more change in Oswestry to indicate reduced impairment with every day living   4 Pt to ambulate with normal gait, no longer antalgic, and for confidence walking to increase as it relates to Rt leg

## 2024-06-14 ENCOUNTER — TREATMENT (OUTPATIENT)
Dept: PHYSICAL THERAPY | Facility: HOSPITAL | Age: 64
End: 2024-06-14
Payer: COMMERCIAL

## 2024-06-14 DIAGNOSIS — M54.16 LUMBAR RADICULOPATHY: ICD-10-CM

## 2024-06-14 DIAGNOSIS — M62.81 TRUNCAL MUSCLE WEAKNESS: ICD-10-CM

## 2024-06-14 DIAGNOSIS — R29.898 WEAKNESS OF RIGHT LEG: ICD-10-CM

## 2024-06-14 PROCEDURE — 97110 THERAPEUTIC EXERCISES: CPT | Mod: GP | Performed by: PHYSICAL THERAPIST

## 2024-06-14 ASSESSMENT — PAIN - FUNCTIONAL ASSESSMENT: PAIN_FUNCTIONAL_ASSESSMENT: 0-10

## 2024-06-14 ASSESSMENT — PAIN DESCRIPTION - DESCRIPTORS: DESCRIPTORS: CRAMPING;SHOOTING

## 2024-06-14 ASSESSMENT — PAIN SCALES - GENERAL: PAINLEVEL_OUTOF10: 3

## 2024-06-14 NOTE — PROGRESS NOTES
Physical Therapy    Physical Therapy    Physical Therapy Treatment      Patient Name: Janet Santillan  MRN: [id number]  Today's Date: 6/14/2024  Visit # 3  1 EVAL AND 20 VISITS 6/4 TO 9/30   Time Calculation  Start Time: 1015  Stop Time: 1100  Time Calculation (min): 45 min      Subjective  continues to have symptoms, has to do PT for MRI, still working 4  12 hour days  And does not feel she can keep up with this physical job         Precautions  Precautions  STEADI Fall Risk Score (The score of 4 or more indicates an increased risk of falling): 2 ( no falls but fear falling due to rt leg weakness/ pain)    Current Problem:   1. Lumbar radiculopathy  Follow Up In Physical Therapy      2. Weakness of right leg  Follow Up In Physical Therapy      3. Truncal muscle weakness  Follow Up In Physical Therapy          Pain:  Pain Assessment  Pain Assessment: 0-10  Pain Score: 3  Pain Type: Chronic pain  Pain Radiating Towards: continues Rt foot / toes, lower leg  Pain Descriptors: Cramping, Shooting  Pain Frequency: Constant/continuous  Pain Onset: Progressive  Clinical Progression:  (gradually worsening)      Objective         Demo improved core engagement with standing and able to maintain with exercises with intermittent cues    Janet Santillan is a 63-year-old woman who comes in today with complaints of chronic right-sided back pain and leg pain.  She describes it as a throbbing pain.  There is associated numbness and tingling.  Pain is rather constant. Main complaint is Rt lower leg cramping and spams across ankle and into toes. Numbness in Rt foot toes.  Pain since 2020.  Hopes to have MRI but needs PT first per insuranance     MRI in 2021 demonstrates disc bulging L4-5 L5-S1.  Evidence of prior right 5 S1 hemilaminectomy is noted.  Hx of Lumbar surgery 2011 . 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 Rt lower leg , cramping in toes, LBP Rt.  Chronic 2020, hx of spine surgery 2011,  hopes to get MRI, needs to do PT first. Works 4  12 hour days and so has only 1 day a week available to schedule PT. Some fear of falling , no fallls, the Rt leg is weak and cramps up, numb toes. Pt has core weakness and does not know how to engage abdominals, she has Rt Leg weakness and ankle and toe weakness, gait abnormality     Gait antalgic, decreased wt bear time Rt  LE     Bed mobility demo  Log roll technique with good teach back  Prone 30 sec but could not tolerate, increased cramping in foot / toes         Treatments:  Started land based therapy   Treatments:  EXERCISES             Date      6-14-24       VISIT#   #2/20 #3/20 # #       REPS REPS REPS REPS   Nustep   10' @L1  10@L2       Core abdominal training  Pelvic tilt hooklying  Pelvic tilt with movement of LE  Pelvic rocking in seated  Pelvic tilt seated  Standing and engage core with BTW                  10x      10 x   10 x  10 x  10x        10 x                       LE ex standing with core engaged NEW    3 way kick 2 x 10 ea  3 way kick 2 x 10 ea       HS curls   2 x 10   10 x 2       Scapular rows  Pull downs   2 x 10 red  2 x 10 red  2 x 10 GRN  2x 10 GRN                     Prone lying   PPU   X  5x                                                   Log roll bed mobility training      Performed         ice      5'       Posture educate      yes       HEP (    Build)                    Assessment: pt continues to have symptoms.but no change with ex       Plan:  Continue to educate to engage core with activities    Goals:  Active       PT Problem       PT Goal        Start:  06/04/24    Expected End:  06/25/24       1 Patient to be independent with home exercises within pain free range to stretch Lower extremities and to strengthen core abdominals and core stabilization to  increase mobility  2 Patient to demonstrate understanding of what it means to have neutral posture alignment, the use of positioning strategies and body mechanics principles to reduce pain with everyday activities.  3 Patient to gain the functional range of motion necessary in the  lumbar spine to perform activities of daily living with increased ease.              PT Goal        Start:  06/04/24    Expected End:  07/16/24       1 Patient to have reduced pain by 50% or more for increased function and mobility  2 Patient to demonstrate 1/3 MMT strength gain or more in targeted muscle groups and core strength for increased overall mobility  3 Patient to demonstrate a 4 point or more change in Oswestry to indicate reduced impairment with every day living   4 Pt to ambulate with normal gait, no longer antalgic, and for confidence walking to increase as it relates to Rt leg

## 2024-06-17 ENCOUNTER — APPOINTMENT (OUTPATIENT)
Dept: PRIMARY CARE | Facility: CLINIC | Age: 64
End: 2024-06-17
Payer: COMMERCIAL

## 2024-06-25 ENCOUNTER — TREATMENT (OUTPATIENT)
Dept: PHYSICAL THERAPY | Facility: HOSPITAL | Age: 64
End: 2024-06-25
Payer: COMMERCIAL

## 2024-06-25 DIAGNOSIS — M54.16 LUMBAR RADICULOPATHY: ICD-10-CM

## 2024-06-25 DIAGNOSIS — R29.898 WEAKNESS OF RIGHT LEG: ICD-10-CM

## 2024-06-25 DIAGNOSIS — M62.81 TRUNCAL MUSCLE WEAKNESS: ICD-10-CM

## 2024-06-25 PROCEDURE — 97110 THERAPEUTIC EXERCISES: CPT | Mod: GP | Performed by: PHYSICAL THERAPIST

## 2024-06-25 ASSESSMENT — PAIN SCALES - GENERAL: PAINLEVEL_OUTOF10: 3

## 2024-06-25 ASSESSMENT — PAIN DESCRIPTION - DESCRIPTORS: DESCRIPTORS: CRAMPING;SHOOTING

## 2024-06-25 NOTE — PROGRESS NOTES
Physical Therapy    Physical Therapy    Physical Therapy Treatment      Patient Name: Janet Santillan  MRN: [id number]  Today's Date: 6/25/2024  Visit # 4  1 EVAL AND 20 VISITS 6/4 TO 9/30   Time Calculation  Start Time: 1115  Stop Time: 1200  Time Calculation (min): 45 min      Subjective    On medical leave since 6-21-24 ( had been standing and working 4  12 hour days)   has to do PT for MRI  Continue rt leg radicular and chronic LBP           Precautions  Precautions  STEADI Fall Risk Score (The score of 4 or more indicates an increased risk of falling): 2 ( no falls, fear of falling due to Rt leg weakness)    Current Problem:   1. Lumbar radiculopathy  Follow Up In Physical Therapy      2. Weakness of right leg  Follow Up In Physical Therapy      3. Truncal muscle weakness  Follow Up In Physical Therapy          Pain:  Pain Assessment  0-10 (Numeric) Pain Score: 3  Pain Type: Chronic pain  Pain Radiating Towards: Rt LE to foot/toes  Pain Descriptors: Cramping, Shooting  Pain Frequency: Constant/continuous      Objective         Off work now on medical leave  Stands with Wt shifted off Rt LE .   Gait antalgic, decreased wt bear time Rt LE   Rt LE weaker than left noted on Shuttle  Prone press up with no reduction in symptoms     Lumbar radiculopathy  Rt LE, constant 3- 8/10 . Rt lower leg , cramping in toes, LBP Rt.  Chronic 2020, hx of spine surgery 2011,  hopes to get MRI, needs to do PT first. Works 4  12 hour days and so has only 1 day a week available to schedule PT. Some fear of falling , no fallls, the Rt leg is weak and cramps up, numb toes. Pt has core weakness and does not know how to engage abdominals, she has Rt Leg weakness and ankle and toe weakness, gait abnormality     MRI in 2021 demonstrates disc bulging L4-5 L5-S1.  Evidence of prior right 5 S1 hemilaminectomy is noted.  Hx of Lumbar surgery 2011 . Surgery helped but in 2020 reherniated disc    Treatments:  Treatments:  EXERCISES             Date       6-14-24 6-25-24     VISIT#   #2/20 #3/20 #4/20 #       REPS REPS REPS REPS   Nustep   10' @L1  10@L2  10@L2     Core abdominal training  Pelvic tilt hooklying  Pelvic tilt with movement of LE  Pelvic rocking in seated  Pelvic tilt seated  Standing and engage core with BTW                  10x       10 x   10 x  10 x  10x           10 x     10 x 1      Shuttle DLP    Shuttle SLP        5B 10 x 1  4B 10 x 1  3B 10 x 1 ea     LE ex standing with core engaged NEW    3 way kick 2 x 10 ea  3 way kick 2 x 10 ea       HS curls   2 x 10   10 x 2       Scapular rows  Pull downs   2 x 10 red  2 x 10 red  2 x 10 GRN  2x 10 GRN  2 x 10 GRN  2x 10 GRN                   Prone lying   PPU   X  5x    1 min  10 x                                               Log roll bed mobility training      Performed         ice      5'       Posture educate      yes       HEP (    Build)       [email or web address]      Access Code: [email or web address]  URL: [email or web address]  Date: 06/25/2024  Prepared by: Kaia Chester    Exercises  - Supine Posterior Pelvic Tilt  - 1 x daily - 7 x weekly - 10 reps - 5 hold  - Prone Press Up On Elbows  - 1 x daily - 7 x weekly - 10 reps  - Supine Bridge  - 1 x daily - 7 x weekly - 10 reps - 5 hold  - Supine Piriformis Stretch with Foot on Ground  - 1 x daily - 7 x weekly - 3 reps - 10 hold  - Clamshell  - 1 x daily - 7 x weekly - 10 reps  - Standing March with Counter Support  - 1 x daily - 7 x weekly - 10 reps  - Side Stepping with Counter Support  - 1 x daily - 7 x weekly - 10 reps  - Standing Shoulder Row with Anchored Resistance  - 1 x daily - 7 x weekly - 2 sets - 10 reps  - Shoulder extension with resistance - Neutral  - 1 x daily - 7 x weekly - 2 sets - 10 reps       Assessment: provided HEP porgression. Pt continues to have Rt LE symptoms and weakness  Recommend use of cane or walking stick , pt will consider but does not think it is needed  Initial steps walking are unsteady and improves after  first few steps. Continues to wt bear unevenly     Plan:  continue to try to centralize symptoms, strengthen core and Rt LE    Goals:  Active       PT Problem       PT Goal        Start:  06/04/24    Expected End:  06/25/24       1 Patient to be independent with home exercises within pain free range to stretch Lower extremities and to strengthen core abdominals and core stabilization to increase mobility  2 Patient to demonstrate understanding of what it means to have neutral posture alignment, the use of positioning strategies and body mechanics principles to reduce pain with everyday activities.  3 Patient to gain the functional range of motion necessary in the  lumbar spine to perform activities of daily living with increased ease.              PT Goal        Start:  06/04/24    Expected End:  07/16/24       1 Patient to have reduced pain by 50% or more for increased function and mobility  2 Patient to demonstrate 1/3 MMT strength gain or more in targeted muscle groups and core strength for increased overall mobility  3 Patient to demonstrate a 4 point or more change in Oswestry to indicate reduced impairment with every day living   4 Pt to ambulate with normal gait, no longer antalgic, and for confidence walking to increase as it relates to Rt leg

## 2024-06-28 ENCOUNTER — TREATMENT (OUTPATIENT)
Dept: PHYSICAL THERAPY | Facility: HOSPITAL | Age: 64
End: 2024-06-28
Payer: COMMERCIAL

## 2024-06-28 DIAGNOSIS — M62.81 TRUNCAL MUSCLE WEAKNESS: ICD-10-CM

## 2024-06-28 DIAGNOSIS — M54.16 LUMBAR RADICULOPATHY: ICD-10-CM

## 2024-06-28 DIAGNOSIS — R29.898 WEAKNESS OF RIGHT LEG: ICD-10-CM

## 2024-06-28 PROCEDURE — 97110 THERAPEUTIC EXERCISES: CPT | Mod: GP | Performed by: PHYSICAL THERAPIST

## 2024-06-28 ASSESSMENT — PAIN DESCRIPTION - DESCRIPTORS: DESCRIPTORS: SHOOTING

## 2024-06-28 ASSESSMENT — PAIN SCALES - GENERAL: PAINLEVEL_OUTOF10: 3

## 2024-06-28 NOTE — PROGRESS NOTES
Physical Therapy    Physical Therapy    Physical Therapy Treatment      Patient Name: Janet Santillan  MRN: [id number]  Today's Date: 6/28/2024  Visit # 5  20 VISITS 6/4 TO 9/30   Time Calculation  Start Time: 1110  Stop Time: 1159  Time Calculation (min): 49 min      Subjective    On medical leave since 6-21-24 ( had been standing and working 4  12 hour days)   has to do PT visits for MRI  Continue rt leg radicular and chronic LBP       Precautions  Precautions  STEADI Fall Risk Score (The score of 4 or more indicates an increased risk of falling): 2 ( no falls, fear of Rt Leg adn falling)    Current Problem:   1. Lumbar radiculopathy  Follow Up In Physical Therapy      2. Weakness of right leg  Follow Up In Physical Therapy      3. Truncal muscle weakness  Follow Up In Physical Therapy          Pain:  Pain Assessment  0-10 (Numeric) Pain Score: 3  Pain Type: Chronic pain  Pain Radiating Towards: Rt LE to foot/ toes  Pain Descriptors: Shooting  Pain Frequency: Constant/continuous      Objective           Rt post thigh cramps / spasms,   No change with prone press ups  Doing Core  Dead bug ex with progression    Off work now on medical leave  Stands with Wt shifted off Rt LE .   Gait antalgic, decreased wt bear time Rt LE   Rt LE weaker than left noted on Shuttle  Prone press up with no reduction in symptoms      Lumbar radiculopathy  Rt LE, constant 3- 8/10 . Rt lower leg , cramping in toes, LBP Rt.  Chronic 2020, hx of spine surgery 2011,  hopes to get MRI, needs to do PT first. Works 4  12 hour days and so has only 1 day a week available to schedule PT. Some fear of falling , no fallls, the Rt leg is weak and cramps up, numb toes. Pt has core weakness and does not know how to engage abdominals, she has Rt Leg weakness and ankle and toe weakness, gait abnormality      MRI in 2021 demonstrates disc bulging L4-5 L5-S1.  Evidence of prior right 5 S1 hemilaminectomy is noted.  Hx of Lumbar surgery 2011 . Surgery helped  but in 2020 reherniated disc          Treatments:  EXERCISES             Date      6-14-24 6-25-24 6-28-24   VISIT#   #2/20 #3/20 #4/20 #5/20       REPS REPS REPS REPS   Nustep   10' @L1  10@L2  10@L2  10 @L2   Core abdominal training  Pelvic tilt hooklying  Pelvic tilt with movement of LE  Pelvic rocking in seated  Pelvic tilt seated  Standing and engage core with BTW                  10x       10 x   10 x  10 x  10x           10 x     10 x 1  10 x 1    10 x  10x  10x  10 x  10 x 2 deadbug  With UE / LE ext  And flex    Shuttle DLP     Shuttle SLP        5B 10 x 1  4B 10 x 1  3B 10 x 1 ea  5B 10 x2    right  4B 6x1 Rt  3B 10 x1    Left   4B 10 x 2 left            LE ex standing with core engaged NEW    3 way kick 2 x 10 ea  3 way kick 2 x 10 ea       HS curls   2 x 10   10 x 2       Scapular rows  Pull downs   2 x 10 red  2 x 10 red  2 x 10 GRN  2x 10 GRN  2 x 10 GRN  2x 10 GRN  home                 Prone lying   PPU   X  5x    1 min  10 x   1 min   10 x     pelvic tilt with blue tband abd hip          10 x blue                               Log roll bed mobility training      Performed     indepen    ice      5'       Posture educate      yes       HEP (    Build)       [email or web address]      Access Code: [email or web address]  URL: [email or web address]  Date: 06/25/2024  Prepared by: Kaia Chester     Exercises  - Supine Posterior Pelvic Tilt  - 1 x daily - 7 x weekly - 10 reps - 5 hold  - Prone Press Up On Elbows  - 1 x daily - 7 x weekly - 10 reps  - Supine Bridge  - 1 x daily - 7 x weekly - 10 reps - 5 hold  - Supine Piriformis Stretch with Foot on Ground  - 1 x daily - 7 x weekly - 3 reps - 10 hold  - Clamshell  - 1 x daily - 7 x weekly - 10 reps  - Standing March with Counter Support  - 1 x daily - 7 x weekly - 10 reps  - Side Stepping with Counter Support  - 1 x daily - 7 x weekly - 10 reps  - Standing Shoulder Row with Anchored Resistance  - 1 x daily - 7 x weekly - 2 sets - 10 reps  - Shoulder extension  with resistance - Neutral  - 1 x daily - 7 x weekly - 2 sets - 10 reps        Assessment:    provided HEP porgression with tband . Pt continues to have Rt LE symptoms and weakness  Recommend use of cane or walking stick , pt will consider but does not think it is needed  Initial steps walking are unsteady and improves after first few steps. Continues to wt bear unevenly    Plan: continue to try to centralize symptoms, strengthen core and Rt LE     Goals:  Active       PT Problem       PT Goal        Start:  06/04/24    Expected End:  06/25/24       1 Patient to be independent with home exercises within pain free range to stretch Lower extremities and to strengthen core abdominals and core stabilization to increase mobility  2 Patient to demonstrate understanding of what it means to have neutral posture alignment, the use of positioning strategies and body mechanics principles to reduce pain with everyday activities.  3 Patient to gain the functional range of motion necessary in the  lumbar spine to perform activities of daily living with increased ease.              PT Goal        Start:  06/04/24    Expected End:  07/16/24       1 Patient to have reduced pain by 50% or more for increased function and mobility  2 Patient to demonstrate 1/3 MMT strength gain or more in targeted muscle groups and core strength for increased overall mobility  3 Patient to demonstrate a 4 point or more change in Oswestry to indicate reduced impairment with every day living   4 Pt to ambulate with normal gait, no longer antalgic, and for confidence walking to increase as it relates to Rt leg

## 2024-07-08 ENCOUNTER — TREATMENT (OUTPATIENT)
Dept: PHYSICAL THERAPY | Facility: HOSPITAL | Age: 64
End: 2024-07-08
Payer: COMMERCIAL

## 2024-07-08 DIAGNOSIS — R29.898 WEAKNESS OF RIGHT LEG: ICD-10-CM

## 2024-07-08 DIAGNOSIS — M54.16 LUMBAR RADICULOPATHY: ICD-10-CM

## 2024-07-08 DIAGNOSIS — M62.81 TRUNCAL MUSCLE WEAKNESS: ICD-10-CM

## 2024-07-08 PROCEDURE — 97110 THERAPEUTIC EXERCISES: CPT | Mod: GP | Performed by: PHYSICAL THERAPIST

## 2024-07-08 ASSESSMENT — PAIN SCALES - GENERAL: PAINLEVEL_OUTOF10: 3

## 2024-07-08 ASSESSMENT — PAIN DESCRIPTION - DESCRIPTORS: DESCRIPTORS: SHOOTING

## 2024-07-08 NOTE — PROGRESS NOTES
Physical Therapy    Physical Therapy    Physical Therapy Treatment      Patient Name: Janet Santillan  MRN: [id number]  Today's Date: 2024  Visit # 6  Time Calculation  Start Time: 100  Stop Time: 145  Time Calculation (min): 45 min      Subjective    3/10 down Rt LE to toes , constant numbness in foot, pain in Rt LE, 3 years of symptoms  On medical leave since 24 ( had been standing and working 4  12 hour days)   has to do PT visits for MRI, she will follow up with her MD re this  Completed 6 PT visits today  Continue rt leg radicular and chronic LBP            Precautions  Precautions  STEADI Fall Risk Score (The score of 4 or more indicates an increased risk of falling): 2 ( no falls, fears falls)    Current Problem:   1. Lumbar radiculopathy  Follow Up In Physical Therapy      2. Weakness of right leg  Follow Up In Physical Therapy      3. Truncal muscle weakness  Follow Up In Physical Therapy          Pain:  Pain Assessment  0-10 (Numeric) Pain Score: 3  Pain Type: Chronic pain  Pain Radiating Towards: rt lef to ankle/ toes  Pain Descriptors: Shooting  Pain Frequency: Constant/continuous  Pain Onset: Progressive      Objective         Name and  confirmed    Rt post thigh cramps / spasms,   No change with prone press ups  Doing Core  Dead bug ex with progression     Off work now on medical leave  Stands with Wt shifted off Rt LE .   Gait antalgic, decreased wt bear time Rt LE   Rt LE weaker than left noted on Shuttle  Prone press up with no reduction in symptoms      Lumbar radiculopathy  Rt LE, constant 3- 8/10 . Rt lower leg , cramping in toes, LBP Rt.  Chronic , hx of spine surgery ,  hopes to get MRI, needs to do PT first. Works 4  12 hour days and so has only 1 day a week available to schedule PT. Some fear of falling , no fallls, the Rt leg is weak and cramps up, numb toes. Pt has core weakness and does not know how to engage abdominals, she has Rt Leg weakness and ankle and toe weakness,  gait abnormality      MRI in 2021 demonstrates disc bulging L4-5 L5-S1.  Evidence of prior right 5 S1 hemilaminectomy is noted.  Hx of Lumbar surgery 2011 . 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)     Extremity/Trunk Assessments:     Rt LE weakness remains     MMT  Hip flex Rt 4-/5   left 5/5  Knee ext Rt 4-/5  left 5/5  Knee flex Rt 4-/5   left 5/5  Ankle DF Rt 3/5   left 5/5  Ankle IN / EV Rt 3/5  left 5/5  Toe weakness 3/5 Rt left 5/5     Trunk AROM 7-8-24  Flex reach knees ( pain)  Ext 10' past neutral  Side bend left pain and limited  Side bend Rt WNL  Figure 4 tight Rt hip      +TTP Rt Low back - mid back        Gait antalgic, decreased wt bear time Rt  LE with initial steps after standing from chair     Pt works 4 12 hour days typically but is off work currently           Outcome Measures:  Oswestry 18%         Treatments:  Treatments:  EXERCISES           recheck   Date      6-14-24 6-25-24 6-28-24 7-8-24   VISIT#   #2/20 #3/20 #4/20 #5/20 #6/24       REPS REPS REPS REPS    Nustep   10' @L1  10@L2  10@L2  10 @L2 10@l2   Core abdominal training  Pelvic tilt hooklying  Pelvic tilt with movement of LE  Pelvic rocking in seated  Pelvic tilt seated  Standing and engage core with BTW                  10x       10 x   10 x  10 x  10x           10 x     10 x 1  10 x 1     10 x  10x  10x  10 x  10 x 2 deadbug  With UE / LE ext  And flex     Shuttle DLP     Shuttle SLP        5B 10 x 1  4B 10 x 1  3B 10 x 1 ea  5B 10 x2     right  4B 6x1 Rt  3B 10 x1     Left   4B 10 x 2 left              5B 10 x 2      right  4B 6x1 Rt  3B 10 x1     Left   4B 10 x 2 left                 Q- Hip   Flex, abd, ext       5# 10 x 1 each leg each direction   LE ex standing with core engaged NEW    3 way kick 2 x 10 ea  3 way kick 2 x 10 ea        HS curls   2 x 10   10 x 2         Scapular rows  Pull downs   2 x 10 red  2 x 10 red  2 x 10 GRN  2x 10 GRN  2 x 10 GRN  2x 10 GRN  home GRN  10 x 2  10 x 2                  Prone lying   PPU   X  5x    1 min  10 x   1 min   10 x      pelvic tilt with blue tband abd hip          10 x blue                                  Log roll bed mobility training      Performed     indepen     ice      5'        Posture educate      yes        HEP (    Build)       [email or web address]       Access Code: [email or web address]  URL: [email or web address]  Date: 06/25/2024  Prepared by: Kaia Chester     Exercises  - Supine Posterior Pelvic Tilt  - 1 x daily - 7 x weekly - 10 reps - 5 hold  - Prone Press Up On Elbows  - 1 x daily - 7 x weekly - 10 reps  - Supine Bridge  - 1 x daily - 7 x weekly - 10 reps - 5 hold  - Supine Piriformis Stretch with Foot on Ground  - 1 x daily - 7 x weekly - 3 reps - 10 hold  - Clamshell  - 1 x daily - 7 x weekly - 10 reps  - Standing March with Counter Support  - 1 x daily - 7 x weekly - 10 reps  - Side Stepping with Counter Support  - 1 x daily - 7 x weekly - 10 reps  - Standing Shoulder Row with Anchored Resistance  - 1 x daily - 7 x weekly - 2 sets - 10 reps  - Shoulder extension with resistance - Neutral  - 1 x daily - 7 x weekly - 2 sets - 10 reps          Assessment: Continues to have Rt LE weakness and pain and numbness  Pt will follow up with her MD  Pt will continue PT to strengthen core and LE as able     Plan: continue to strengthening core and Rt LE    Goals:  Active       PT Problem       PT Goal  (Progressing)       Start:  06/04/24    Expected End:  07/22/24       1 Patient to be independent with home exercises within pain free range to stretch Lower extremities and to strengthen core abdominals and core stabilization to increase mobility. MET  2 Patient to demonstrate understanding of what it means to have neutral posture alignment, the use of positioning strategies and body mechanics principles to reduce pain with  everyday activities.Progressing  3 Patient to gain the functional range of motion necessary in the  lumbar spine to perform activities of daily living with increased ease. Not met, progressed limited              PT Goal  (Progressing)       Start:  06/04/24    Expected End:  08/05/24       1 Patient to have reduced pain by 50% or more for increased function and mobility. Progressig  2 Patient to demonstrate 1/3 MMT strength gain or more in targeted muscle groups and core strength for increased overall mobility. Progressing  3 Patient to demonstrate a 4 point or more change in Oswestry to indicate reduced impairment with every day living . Porgressing  4 Pt to ambulate with normal gait, no longer antalgic, and for confidence walking to increase as it relates to Rt leg  Progressing

## 2024-07-15 ENCOUNTER — TREATMENT (OUTPATIENT)
Dept: PHYSICAL THERAPY | Facility: HOSPITAL | Age: 64
End: 2024-07-15
Payer: COMMERCIAL

## 2024-07-15 DIAGNOSIS — M62.81 TRUNCAL MUSCLE WEAKNESS: ICD-10-CM

## 2024-07-15 DIAGNOSIS — R29.898 WEAKNESS OF RIGHT LEG: ICD-10-CM

## 2024-07-15 DIAGNOSIS — M54.16 LUMBAR RADICULOPATHY: ICD-10-CM

## 2024-07-15 PROCEDURE — 97110 THERAPEUTIC EXERCISES: CPT | Mod: GP | Performed by: PHYSICAL THERAPIST

## 2024-07-15 ASSESSMENT — PAIN DESCRIPTION - DESCRIPTORS: DESCRIPTORS: SHOOTING

## 2024-07-15 ASSESSMENT — PAIN SCALES - GENERAL: PAINLEVEL_OUTOF10: 3

## 2024-07-15 NOTE — PROGRESS NOTES
Physical Therapy    Physical Therapy    Physical Therapy Treatment and Discharge     Patient Name: Janet Santillan  MRN: [id number]  Today's Date: 7/15/2024  Visit # 7  Time Calculation  Start Time: 1115  Stop Time: 1200  Time Calculation (min): 45 min    Discharge 7-15-24  Total visits #7  Reason for DC: pt to follow up with MD , continued symptoms and Rt LE weakness  Effecting her gait and function    Subjective    Continues to have symptoms. Will see MD on Aug 6  3/10 down Rt LE to toes , constant numbness in foot, pain in Rt LE, 3 years of symptoms  On medical leave since 24 ( had been standing and working 4  12 hour days)   has to do PT visits for MRI, she will follow up with her MD re this  Visit # 7 PT visits today  Continue rt leg radicular and chronic LBP  Pt would like to be discharged from PT and hopes to have MRI approved            Precautions  Precautions  STEADI Fall Risk Score (The score of 4 or more indicates an increased risk of falling): 2 ( no falls, some fear of fall related to Rt LE weakness)    Current Problem:   1. Lumbar radiculopathy  Follow Up In Physical Therapy      2. Weakness of right leg  Follow Up In Physical Therapy      3. Truncal muscle weakness  Follow Up In Physical Therapy          Pain:  Pain Assessment  0-10 (Numeric) Pain Score: 3  Pain Type: Chronic pain  Pain Radiating Towards: Rt LE to foot/toes  Pain Descriptors: Shooting  Pain Frequency: Constant/continuous      Objective         Name and  confirmed    Pt would like to be discharged, she will see MD Aug 6 and has been hoping for MRI   Rt LE weakness remains     MMT  Hip flex Rt 4-/5   left 5/5  Knee ext Rt 4-/5  left 5/5  Knee flex Rt 4-/5   left 5/5  Ankle DF Rt 3/5   left 5/5  Ankle IN / EV Rt 3/5  left 5/5  Toe weakness 3/5 Rt left 5/5     Trunk AROM 24  Flex reach knees ( pain)  Ext 10' past neutral  Side bend Rt pain, limited  Side bend left WNL  Figure 4 limited in Rt hip      +TTP Rt Low back - mid back     - provided demo of IFC     Gait antalgic, decreased wt bear time Rt  LE with initial steps after standing from chair     Pt is off work currently    Stands with Wt shifted off Rt LE .   Gait antalgic, decreased wt bear time Rt LE   Rt LE weaker than left         Lumbar radiculopathy  Rt LE, constant 3- 8/10 . Rt lower leg , cramping in toes, LBP Rt.  Chronic 2020, hx of spine surgery 2011,  hopes to get MRI, needs to do PT first.    MRI in 2021 demonstrates disc bulging L4-5 L5-S1.  Evidence of prior right 5 S1 hemilaminectomy is noted.  Hx of Lumbar surgery 2011 . Surgery helped but in 2020 reherniated disc    Outcome Measures:  Oswestry 18% (improved from 24% )         Treatments:  Treatments:  EXERCISES       recheck    Date  6-14-24  6-25-24  6-28-24 7-8-24 7-15-24   VISIT# #3/20 #4/20 #5/20 #6/24 #7     REPS REPS REPS      Nustep  10@L2  10@L2  10 @L2 10@l2 10 @L3   Core abdominal training  Pelvic tilt hooklying  Pelvic tilt with movement of LE  Pelvic rocking in seated  Pelvic tilt seated  Standing and engage core with BTW       10 x   10 x  10 x  10x           10 x     10 x 1  10 x 1     10 x  10x  10x  10 x  10 x 2 deadbug  With UE / LE ext  And flex   Review, pt is indepen with HEP    Shuttle DLP     Shuttle SLP    5B 10 x 1  4B 10 x 1  3B 10 x 1 ea  5B 10 x2     right  4B 6x1 Rt  3B 10 x1     Left   4B 10 x 2 left              5B 10 x 2        right  4B 6x1 Rt  3B 10 x1     Left   4B 10 x 2 left                 NP   Q- Hip   Flex, abd, ext        5# 10 x 1 each leg each direction HEP   LE ex standing with core engaged NEW   3 way kick 2 x 10 ea          HS curls  10 x 2          Scapular rows  Pull downs  2 x 10 GRN  2x 10 GRN  2 x 10 GRN  2x 10 GRN  home GRN  10 x 2  10 x 2 HEP                Prone lying   PPU    1 min  10 x   1 min   10 x    HEP    pelvic tilt with blue tband abd hip      10 x blue   HEP                 IFC Lumbar         7'   Log roll bed mobility training  Performed     indepen        ice  5'          Posture educate  yes       yes   HEP    [id number]     [id number]    Access Code: [email or web address]  URL: [email or web address]  Prepared by: Kaia Nemo     Exercises  - Supine Posterior Pelvic Tilt  - 1 x daily - 7 x weekly - 10 reps - 5 hold  - Prone Press Up On Elbows  - 1 x daily - 7 x weekly - 10 reps  - Supine Bridge  - 1 x daily - 7 x weekly - 10 reps - 5 hold  - Supine Piriformis Stretch with Foot on Ground  - 1 x daily - 7 x weekly - 3 reps - 10 hold  - Clamshell  - 1 x daily - 7 x weekly - 10 reps  - Standing March with Counter Support  - 1 x daily - 7 x weekly - 10 reps  - Side Stepping with Counter Support  - 1 x daily - 7 x weekly - 10 reps  - Standing Shoulder Row with Anchored Resistance  - 1 x daily - 7 x weekly - 2 sets - 10 reps  - Shoulder extension with resistance - Neutral  - 1 x daily - 7 x weekly - 2 sets - 10 reps       Assessment:  pt continues to have Rt LE weakness throughout LE and foot and pain continues. gait abnormality related to Rt LE antalgic gait  Pt states she hopes to have MRI once it is authorized  she would like to be DC from PT. She agrees to work on her  home exercises.  PT demonstrated  a home electrical stim unit in this visit.   IFC to  Rt Lumbar to reduce muscular back pain , pt was provided info on this type of unit if she is interested.     Plan: DC PT.     Goals: Pt did not meet goals and she will return to physician re continued weakness and pain effecting her gait and her ability to work and function  Active       PT Problem       PT Goal  (Progressing)       Start:  06/04/24    Expected End:  07/22/24       1 Patient to be independent with home exercises within pain free range to stretch Lower extremities and to strengthen core abdominals and core stabilization to increase mobility. MET  2 Patient to demonstrate understanding of what it means to have neutral posture alignment, the use of positioning strategies and body mechanics  principles to reduce pain with everyday activities.Progressing  3 Patient to gain the functional range of motion necessary in the  lumbar spine to perform activities of daily living with increased ease. Not met, progressed limited              PT Goal  (Progressing)       Start:  06/04/24    Expected End:  08/05/24       1 Patient to have reduced pain by 50% or more for increased function and mobility. Progressing. Not MET  2 Patient to demonstrate 1/3 MMT strength gain or more in targeted muscle groups and core strength for increased overall mobility. Progressing  3 Patient to demonstrate a 4 point or more change in Oswestry to indicate reduced impairment with every day living . Porgressing  4 Pt to ambulate with normal gait, no longer antalgic, and for confidence walking to increase as it relates to Rt leg  NOT MET

## 2024-07-22 ENCOUNTER — APPOINTMENT (OUTPATIENT)
Dept: PHYSICAL THERAPY | Facility: HOSPITAL | Age: 64
End: 2024-07-22
Payer: COMMERCIAL

## 2024-07-24 ENCOUNTER — APPOINTMENT (OUTPATIENT)
Dept: PHYSICAL THERAPY | Facility: HOSPITAL | Age: 64
End: 2024-07-24
Payer: COMMERCIAL

## 2024-08-06 ENCOUNTER — OFFICE VISIT (OUTPATIENT)
Dept: ORTHOPEDIC SURGERY | Facility: CLINIC | Age: 64
End: 2024-08-06
Payer: COMMERCIAL

## 2024-08-06 ENCOUNTER — HOSPITAL ENCOUNTER (OUTPATIENT)
Dept: RADIOLOGY | Facility: CLINIC | Age: 64
Discharge: HOME | End: 2024-08-06
Payer: COMMERCIAL

## 2024-08-06 VITALS — HEIGHT: 61 IN | BODY MASS INDEX: 33.42 KG/M2 | WEIGHT: 177 LBS

## 2024-08-06 DIAGNOSIS — M54.50 LOW BACK PAIN, UNSPECIFIED BACK PAIN LATERALITY, UNSPECIFIED CHRONICITY, UNSPECIFIED WHETHER SCIATICA PRESENT: ICD-10-CM

## 2024-08-06 DIAGNOSIS — M54.16 LUMBAR RADICULOPATHY: ICD-10-CM

## 2024-08-06 PROCEDURE — 72114 X-RAY EXAM L-S SPINE BENDING: CPT | Performed by: RADIOLOGY

## 2024-08-06 PROCEDURE — 99214 OFFICE O/P EST MOD 30 MIN: CPT | Performed by: ORTHOPAEDIC SURGERY

## 2024-08-06 PROCEDURE — 72110 X-RAY EXAM L-2 SPINE 4/>VWS: CPT

## 2024-08-06 PROCEDURE — 3008F BODY MASS INDEX DOCD: CPT | Performed by: ORTHOPAEDIC SURGERY

## 2024-08-06 ASSESSMENT — PAIN - FUNCTIONAL ASSESSMENT: PAIN_FUNCTIONAL_ASSESSMENT: 0-10

## 2024-08-06 NOTE — PROGRESS NOTES
HPI:Janet Santillan is a 63-year-old who comes in today for follow-up.  She continues to have right-sided back pain and right leg pain.  Symptoms go down the leg in an S1 distribution.  There is tingling and some twitching in the leg.  She has just completed physical therapy at [facility].  Despite therapy her symptoms persist.  She has not had an MRI.      ROS:  Reviewed on EMR and patient intake sheet.    PMH/SH:  Reviewed on EMR and patient intake sheet.    Exam:  Physical Exam    Constitutional: Well appearing; no acute distress  Eyes: pupils are equal and round  Psych: normal affect  Respiratory: non-labored breathing  Cardiovascular: regular rate and rhythm  GI: non-distended abdomen  Musculoskeletal: no pain with range of motion of the hips bilaterally  Neurologic: [5]/5 strength in the lower extremities bilaterally]; [mildly positive right] straight leg raise    Radiology:     X-rays demonstrate severe disc degeneration L5-S1    Diagnosis:    Lumbar radiculopathy    Assessment and Plan:   63-year-old woman with intractable lumbar radiculopathy which was persistent despite recent physical therapy.  At this time, she will need an MRI and follow-up to discuss potential surgical intervention.    The patient was in agreement with the plan. At the end of the visit today, the patient felt that all questions had been answered satisfactorily.  The patient was pleased with the visit and very appreciative for the care rendered.     Thank you very much for the kind referral.  It is a privilege, and a pleasure, to partner with you in the care of your patients.  I would be delighted to assist you with any further consultations as needed.          Mamadou Elaine MD    Chief of Spine Surgery, [facility]  Director of Spine Service, [facility]  , Department of Orthopaedics  [employer]  [id number]  [place] [address]  P: [phone]  [email or web address]    This note was dictated with voice recognition software.  It has not been proofread for grammatical errors, typographical mistakes or other semantic inconsistencies.

## 2024-08-06 NOTE — LETTER
August 6, 2024     Rylan Salcedo MD  [address]  Formerly named [facility], [address] [place] [id number]    Patient: Janet Santillan   YOB: 1960   Date of Visit: 8/6/2024       Dear Dr. Rylan Salcedo MD:    Thank you for referring Janet Santillan to me for evaluation. Below are my notes for this consultation.  If you have questions, please do not hesitate to call me. I look forward to following your patient along with you.       Sincerely,     Mamadou Elaine MD      CC: No Recipients  ______________________________________________________________________________________    HPI:Janet Santillan is a 63-year-old who comes in today for follow-up.  She continues to have right-sided back pain and right leg pain.  Symptoms go down the leg in an S1 distribution.  There is tingling and some twitching in the leg.  She has just completed physical therapy at [facility].  Despite therapy her symptoms persist.  She has not had an MRI.      ROS:  Reviewed on EMR and patient intake sheet.    PMH/SH:  Reviewed on EMR and patient intake sheet.    Exam:  Physical Exam    Constitutional: Well appearing; no acute distress  Eyes: pupils are equal and round  Psych: normal affect  Respiratory: non-labored breathing  Cardiovascular: regular rate and rhythm  GI: non-distended abdomen  Musculoskeletal: no pain with range of motion of the hips bilaterally  Neurologic: [5]/5 strength in the lower extremities bilaterally]; [mildly positive right] straight leg raise    Radiology:     X-rays demonstrate severe disc degeneration L5-S1    Diagnosis:    Lumbar radiculopathy    Assessment and Plan:   63-year-old woman with intractable lumbar radiculopathy which was persistent despite recent physical therapy.  At this time, she will need an MRI and follow-up to discuss potential surgical intervention.    The patient was in agreement with the plan. At the end of the visit today, the patient felt that all questions had  been answered satisfactorily.  The patient was pleased with the visit and very appreciative for the care rendered.     Thank you very much for the kind referral.  It is a privilege, and a pleasure, to partner with you in the care of your patients.  I would be delighted to assist you with any further consultations as needed.          Mamadou Elaine MD    Chief of Spine Surgery, [facility]  Director of Spine Service, [facility]  , Department of Orthopaedics  [facility] [employer]  [address] [id number]  P: [phone]  [email or web address]    This note was dictated with voice recognition software.  It has not been proofread for grammatical errors, typographical mistakes or other semantic inconsistencies.

## 2024-08-17 ENCOUNTER — HOSPITAL ENCOUNTER (OUTPATIENT)
Dept: RADIOLOGY | Facility: HOSPITAL | Age: 64
Discharge: HOME | End: 2024-08-17
Payer: COMMERCIAL

## 2024-08-17 DIAGNOSIS — M54.16 LUMBAR RADICULOPATHY: ICD-10-CM

## 2024-08-17 PROCEDURE — 72148 MRI LUMBAR SPINE W/O DYE: CPT | Performed by: RADIOLOGY

## 2024-08-17 PROCEDURE — 72148 MRI LUMBAR SPINE W/O DYE: CPT

## 2024-09-10 ENCOUNTER — OFFICE VISIT (OUTPATIENT)
Dept: ORTHOPEDIC SURGERY | Facility: CLINIC | Age: 64
End: 2024-09-10
Payer: COMMERCIAL

## 2024-09-10 DIAGNOSIS — M96.1 LUMBAR POST-LAMINECTOMY SYNDROME: ICD-10-CM

## 2024-09-10 DIAGNOSIS — M54.16 LUMBAR RADICULOPATHY: ICD-10-CM

## 2024-09-10 DIAGNOSIS — M51.26 DISPLACEMENT OF LUMBAR INTERVERTEBRAL DISC WITHOUT MYELOPATHY: ICD-10-CM

## 2024-09-10 DIAGNOSIS — M48.062 LUMBAR STENOSIS WITH NEUROGENIC CLAUDICATION: Primary | ICD-10-CM

## 2024-09-10 PROCEDURE — 99215 OFFICE O/P EST HI 40 MIN: CPT | Performed by: ORTHOPAEDIC SURGERY

## 2024-09-10 ASSESSMENT — PAIN SCALES - GENERAL: PAINLEVEL_OUTOF10: 5 - MODERATE PAIN

## 2024-09-10 ASSESSMENT — PAIN - FUNCTIONAL ASSESSMENT: PAIN_FUNCTIONAL_ASSESSMENT: 0-10

## 2024-09-10 ASSESSMENT — PAIN DESCRIPTION - DESCRIPTORS: DESCRIPTORS: ACHING;THROBBING

## 2024-09-10 NOTE — LETTER
September 10, 2024     Rylan Salcedo MD  [address]  [facility], [address] [place] [address]    Patient: Janet Santillan   YOB: 1960   Date of Visit: 9/10/2024       Dear Dr. Rylan Salcedo MD:    Thank you for referring Janet Santillan to me for evaluation. Below are my notes for this consultation.  If you have questions, please do not hesitate to call me. I look forward to following your patient along with you.       Sincerely,     Mamadou Elaine MD      CC: No Recipients  ______________________________________________________________________________________    HPI:Janet Santillan is a 63-year-old woman who comes in today for follow-up.  She continues to have back pain and right leg pain.  Past medical history significant for remote prior decompression at L4-5 and L5-S1 years ago.  She has now completed 6 weeks of physical therapy [facility].  Despite that therapy, her symptoms persist.  Her quality of life is intolerable.  She comes to discuss potential surgery today.      ROS:  Reviewed on EMR and patient intake sheet.    PMH/SH:  Reviewed on EMR and patient intake sheet.    Exam:  Physical Exam    Constitutional: Well appearing; no acute distress  Eyes: pupils are equal and round  Psych: normal affect  Respiratory: non-labored breathing  Cardiovascular: regular rate and rhythm  GI: non-distended abdomen  Musculoskeletal: no pain with range of motion of the hips bilaterally  Neurologic: [4+]/5 strength in the lower extremities bilaterally]; [mildly positive right] straight leg raise    Radiology:     MRI demonstrates prior decompression on the right side at L4-5 and L5-S1.  Patient has a recurrent disc herniation at L4-5 on the right side as well as a broad-based central to right paracentral L5-S1 disc herniation with resulting lateral recess narrowing at both levels.    Diagnosis:    Lateral recess stenosis; lumbar radiculopathy; lumbar  disc herniation; lumbar laminectomy syndrome    Assessment and Plan:   63-year-old woman with intractable lumbar radiculopathy secondary to recurrent disc herniations at L4-5 and L5-S1 in the setting of prior lumbar surgery.  She has failed nonoperative management including physical therapy over the course of the last 6 weeks [facility].  Revision surgical intervention would be the next step.  She would need a revision decompression L4-5 and L5-S1.  Because of the extent of facet and pars resection required to access the disc and completely decompress the neurologic elements, the patient would need subsequent interbody fusion at L4-5 and L5-S1.  Relative CPT codes include: 64943 and 41414 for the decompression at L4-5 and L5-S1 respectively followed by 47068 and 41331 for the interbody fusion at these levels followed by 28794 for the intervertebral cage followed by 20930 for the MTF allograft chips followed by 20936 for the local bone graft followed by 22842 for the posterior pedicle screw instrumentation.    We discussed surgery today at length, including the specifics of the actual proposed procedure, the projected hospital course and post-operative recovery or rehabilitation, and the expected results of this surgery.  The potential benefits and risks of the proposed surgery include, but are not limited to those of infection, spinal fluid leaks, nerve injury or paralysis, whether that be complete or partial paralysis, foot drop, instrumentation failure, non-union or latent instability, future adjacent segment disease, epidural hematoma, wound dehiscence, reherniation, persistent pain or paresthesias, and the general risks of anaesthesia including, but not limited to those of stroke, heart attack, respiratory difficulties and death.  The patient understands that there are no guarantees in regard to outcome or potential complications associated with the proposed procedure.  After this discussion, the  patient articulated understanding of the topics covered and felt that all questions had been covered and answered satisfactorily.    The patient is a non-smoker.  She would like to proceed sometime in late October.    The patient was in agreement with the plan. At the end of the visit today, the patient felt that all questions had been answered satisfactorily.  The patient was pleased with the visit and very appreciative for the care rendered.     Thank you very much for the kind referral.  It is a privilege, and a pleasure, to partner with you in the care of your patients.  I would be delighted to assist you with any further consultations as needed.          Mamadou Elaine MD    Chief of Spine Surgery, [facility]  Director of Spine Service, [facility]  , Department of Orthopaedics  [facility] [employer]  [address] [place] [address]  P: [phone]  [email or web address]    This note was dictated with voice recognition software.  It has not been proofread for grammatical errors, typographical mistakes or other semantic inconsistencies.

## 2024-09-10 NOTE — PROGRESS NOTES
HPI:Janet Santillan is a 63-year-old woman who comes in today for follow-up.  She continues to have back pain and right leg pain.  Past medical history significant for remote prior decompression at L4-5 and L5-S1 years ago.  She has now completed 6 weeks of physical therapy [facility].  Despite that therapy, her symptoms persist.  Her quality of life is intolerable.  She comes to discuss potential surgery today.      ROS:  Reviewed on EMR and patient intake sheet.    PMH/SH:  Reviewed on EMR and patient intake sheet.    Exam:  Physical Exam    Constitutional: Well appearing; no acute distress  Eyes: pupils are equal and round  Psych: normal affect  Respiratory: non-labored breathing  Cardiovascular: regular rate and rhythm  GI: non-distended abdomen  Musculoskeletal: no pain with range of motion of the hips bilaterally  Neurologic: [4+]/5 strength in the lower extremities bilaterally]; [mildly positive right] straight leg raise    Radiology:     MRI demonstrates prior decompression on the right side at L4-5 and L5-S1.  Patient has a recurrent disc herniation at L4-5 on the right side as well as a broad-based central to right paracentral L5-S1 disc herniation with resulting lateral recess narrowing at both levels.    Diagnosis:    Lateral recess stenosis; lumbar radiculopathy; lumbar disc herniation; lumbar laminectomy syndrome    Assessment and Plan:   63-year-old woman with intractable lumbar radiculopathy secondary to recurrent disc herniations at L4-5 and L5-S1 in the setting of prior lumbar surgery.  She has failed nonoperative management including physical therapy over the course of the last 6 weeks [facility].  Revision surgical intervention would be the next step.  She would need a revision decompression L4-5 and L5-S1.  Because of the extent of facet and pars resection required to access the disc and completely decompress the neurologic elements, the patient would  need subsequent interbody fusion at L4-5 and L5-S1.  Relative CPT codes include: 44889 and 39051 for the decompression at L4-5 and L5-S1 respectively followed by 54379 and 33818 for the interbody fusion at these levels followed by 44938 for the intervertebral cage followed by 20930 for the MTF allograft chips followed by 20936 for the local bone graft followed by 85239 for the posterior pedicle screw instrumentation.    We discussed surgery today at length, including the specifics of the actual proposed procedure, the projected hospital course and post-operative recovery or rehabilitation, and the expected results of this surgery.  The potential benefits and risks of the proposed surgery include, but are not limited to those of infection, spinal fluid leaks, nerve injury or paralysis, whether that be complete or partial paralysis, foot drop, instrumentation failure, non-union or latent instability, future adjacent segment disease, epidural hematoma, wound dehiscence, reherniation, persistent pain or paresthesias, and the general risks of anaesthesia including, but not limited to those of stroke, heart attack, respiratory difficulties and death.  The patient understands that there are no guarantees in regard to outcome or potential complications associated with the proposed procedure.  After this discussion, the patient articulated understanding of the topics covered and felt that all questions had been covered and answered satisfactorily.    The patient is a non-smoker.  She would like to proceed sometime in late October.    The patient was in agreement with the plan. At the end of the visit today, the patient felt that all questions had been answered satisfactorily.  The patient was pleased with the visit and very appreciative for the care rendered.     Thank you very much for the kind referral.  It is a privilege, and a pleasure, to partner with you in the care of your patients.  I would be delighted to assist you  with any further consultations as needed.          Mamadou Elaine MD    Chief of Spine Surgery, [facility]  Director of Spine Service, [facility]  , Department of Orthopaedics  [facility] [employer]  [address] [place] [address]  P: [phone]  [facility]    This note was dictated with voice recognition software.  It has not been proofread for grammatical errors, typographical mistakes or other semantic inconsistencies.

## 2024-09-12 ENCOUNTER — TRANSCRIBE ORDERS (OUTPATIENT)
Dept: ORTHOPEDIC SURGERY | Facility: HOSPITAL | Age: 64
End: 2024-09-12
Payer: COMMERCIAL

## 2024-09-12 DIAGNOSIS — M48.062 PSEUDOCLAUDICATION SYNDROME: ICD-10-CM

## 2024-09-12 DIAGNOSIS — M54.16 LUMBAR RADICULOPATHY: ICD-10-CM

## 2024-09-12 DIAGNOSIS — M51.26 RUPTURED LUMBAR DISC: ICD-10-CM

## 2024-10-14 ENCOUNTER — CLINICAL SUPPORT (OUTPATIENT)
Dept: PREADMISSION TESTING | Facility: HOSPITAL | Age: 64
End: 2024-10-14
Payer: COMMERCIAL

## 2024-10-14 DIAGNOSIS — M54.16 LUMBAR RADICULOPATHY: ICD-10-CM

## 2024-10-14 DIAGNOSIS — M48.062 PSEUDOCLAUDICATION SYNDROME: ICD-10-CM

## 2024-10-14 DIAGNOSIS — M51.26 RUPTURED LUMBAR DISC: ICD-10-CM

## 2024-10-14 RX ORDER — IBUPROFEN 200 MG
200 TABLET ORAL EVERY 6 HOURS
COMMUNITY
End: 2024-10-29 | Stop reason: HOSPADM

## 2024-10-18 ENCOUNTER — LAB (OUTPATIENT)
Dept: LAB | Facility: LAB | Age: 64
End: 2024-10-18
Payer: COMMERCIAL

## 2024-10-18 ENCOUNTER — PRE-ADMISSION TESTING (OUTPATIENT)
Dept: PREADMISSION TESTING | Facility: HOSPITAL | Age: 64
End: 2024-10-18
Payer: COMMERCIAL

## 2024-10-18 ENCOUNTER — DOCUMENTATION (OUTPATIENT)
Dept: PREADMISSION TESTING | Facility: HOSPITAL | Age: 64
End: 2024-10-18

## 2024-10-18 VITALS
WEIGHT: 180.78 LBS | SYSTOLIC BLOOD PRESSURE: 156 MMHG | DIASTOLIC BLOOD PRESSURE: 86 MMHG | HEIGHT: 61 IN | RESPIRATION RATE: 18 BRPM | BODY MASS INDEX: 34.13 KG/M2 | HEART RATE: 76 BPM | TEMPERATURE: 96.4 F | OXYGEN SATURATION: 97 %

## 2024-10-18 DIAGNOSIS — Z01.818 PRE-OP TESTING: ICD-10-CM

## 2024-10-18 DIAGNOSIS — Z01.818 PRE-OP TESTING: Primary | ICD-10-CM

## 2024-10-18 LAB
ABO GROUP (TYPE) IN BLOOD: NORMAL
ANION GAP SERPL CALC-SCNC: 12 MMOL/L (ref 10–20)
ANTIBODY SCREEN: NORMAL
APTT PPP: 35 SECONDS (ref 27–38)
BASOPHILS # BLD AUTO: 0.04 X10*3/UL (ref 0–0.1)
BASOPHILS NFR BLD AUTO: 0.5 %
BUN SERPL-MCNC: 17 MG/DL (ref 6–23)
CALCIUM SERPL-MCNC: 9.4 MG/DL (ref 8.6–10.3)
CHLORIDE SERPL-SCNC: 102 MMOL/L (ref 98–107)
CO2 SERPL-SCNC: 29 MMOL/L (ref 21–32)
CREAT SERPL-MCNC: 0.84 MG/DL (ref 0.5–1.05)
EGFRCR SERPLBLD CKD-EPI 2021: 78 ML/MIN/1.73M*2
EOSINOPHIL # BLD AUTO: 0.08 X10*3/UL (ref 0–0.7)
EOSINOPHIL NFR BLD AUTO: 1 %
ERYTHROCYTE [DISTWIDTH] IN BLOOD BY AUTOMATED COUNT: 12.9 % (ref 11.5–14.5)
EST. AVERAGE GLUCOSE BLD GHB EST-MCNC: 117 MG/DL
GLUCOSE SERPL-MCNC: 95 MG/DL (ref 74–99)
HBA1C MFR BLD: 5.7 %
HCT VFR BLD AUTO: 41.7 % (ref 36–46)
HGB BLD-MCNC: 13.5 G/DL (ref 12–16)
IMM GRANULOCYTES # BLD AUTO: 0.02 X10*3/UL (ref 0–0.7)
IMM GRANULOCYTES NFR BLD AUTO: 0.3 % (ref 0–0.9)
INR PPP: 0.9 (ref 0.9–1.1)
LYMPHOCYTES # BLD AUTO: 3.5 X10*3/UL (ref 1.2–4.8)
LYMPHOCYTES NFR BLD AUTO: 45.2 %
MCH RBC QN AUTO: 27 PG (ref 26–34)
MCHC RBC AUTO-ENTMCNC: 32.4 G/DL (ref 32–36)
MCV RBC AUTO: 83 FL (ref 80–100)
MONOCYTES # BLD AUTO: 0.66 X10*3/UL (ref 0.1–1)
MONOCYTES NFR BLD AUTO: 8.5 %
NEUTROPHILS # BLD AUTO: 3.45 X10*3/UL (ref 1.2–7.7)
NEUTROPHILS NFR BLD AUTO: 44.5 %
NRBC BLD-RTO: 0 /100 WBCS (ref 0–0)
PLATELET # BLD AUTO: 187 X10*3/UL (ref 150–450)
POTASSIUM SERPL-SCNC: 4.7 MMOL/L (ref 3.5–5.3)
PROTHROMBIN TIME: 10.1 SECONDS (ref 9.8–12.8)
RBC # BLD AUTO: 5 X10*6/UL (ref 4–5.2)
RH FACTOR (ANTIGEN D): NORMAL
SODIUM SERPL-SCNC: 138 MMOL/L (ref 136–145)
WBC # BLD AUTO: 7.8 X10*3/UL (ref 4.4–11.3)

## 2024-10-18 PROCEDURE — 86901 BLOOD TYPING SEROLOGIC RH(D): CPT

## 2024-10-18 PROCEDURE — 86850 RBC ANTIBODY SCREEN: CPT

## 2024-10-18 PROCEDURE — 86900 BLOOD TYPING SEROLOGIC ABO: CPT

## 2024-10-18 PROCEDURE — 85610 PROTHROMBIN TIME: CPT

## 2024-10-18 PROCEDURE — 85730 THROMBOPLASTIN TIME PARTIAL: CPT

## 2024-10-18 PROCEDURE — 87081 CULTURE SCREEN ONLY: CPT | Mod: AHULAB | Performed by: NURSE PRACTITIONER

## 2024-10-18 PROCEDURE — 83036 HEMOGLOBIN GLYCOSYLATED A1C: CPT

## 2024-10-18 PROCEDURE — 85025 COMPLETE CBC W/AUTO DIFF WBC: CPT

## 2024-10-18 PROCEDURE — 80048 BASIC METABOLIC PNL TOTAL CA: CPT

## 2024-10-18 PROCEDURE — 36415 COLL VENOUS BLD VENIPUNCTURE: CPT

## 2024-10-18 PROCEDURE — 93005 ELECTROCARDIOGRAM TRACING: CPT | Performed by: NURSE PRACTITIONER

## 2024-10-18 PROCEDURE — 93010 ELECTROCARDIOGRAM REPORT: CPT | Performed by: INTERNAL MEDICINE

## 2024-10-18 RX ORDER — CHLORHEXIDINE GLUCONATE ORAL RINSE 1.2 MG/ML
SOLUTION DENTAL
Qty: 475 ML | Refills: 0 | Status: SHIPPED | OUTPATIENT
Start: 2024-10-18

## 2024-10-18 ASSESSMENT — ENCOUNTER SYMPTOMS
CONSTITUTIONAL NEGATIVE: 1
NAUSEA: 0
NEUROLOGICAL NEGATIVE: 1
PALPITATIONS: 0
NECK NEGATIVE: 1
DYSPNEA AT REST: 0
CONSTIPATION: 0
GASTROINTESTINAL NEGATIVE: 1
VOMITING: 0
DIARRHEA: 0
CARDIOVASCULAR NEGATIVE: 1
BRUISES/BLEEDS EASILY: 1
DYSPNEA WITH EXERTION: 0
ABDOMINAL PAIN: 0
RESPIRATORY NEGATIVE: 1
ENDOCRINE NEGATIVE: 1

## 2024-10-18 NOTE — CPM/PAT NURSE NOTE
CPM/PAT Nurse Note      Name: Janet Santillan (Janet Santillan)  /Age: 1960/63 y.o.       Past Medical History:   Diagnosis Date    Hypertension     Other conditions influencing health status     No significant past medical history    PONV (postoperative nausea and vomiting)     Truncal muscle weakness 2024    Weakness of right leg 2024       Past Surgical History:   Procedure Laterality Date    CARPAL TUNNEL RELEASE Bilateral     OTHER SURGICAL HISTORY  10/28/2019    Plantar fasciotomy    OTHER SURGICAL HISTORY  10/28/2019    Facial surgery    OTHER SURGICAL HISTORY  10/28/2019    Hysterectomy total with unilateral removal of ovary    OTHER SURGICAL HISTORY  10/28/2019    Tonsillectomy    OTHER SURGICAL HISTORY  2021    Back surgery       Patient  has no history on file for sexual activity.    Family History   Problem Relation Name Age of Onset    Hypotension Mother      Heart attack Mother      Other (bladder cancer) Mother      Diabetes Father      Hypertension Father      Heart attack Father      Coronary artery disease Sister      Heart attack Sister      Breast cancer Neg Hx         Allergies   Allergen Reactions    Bee Venom Protein (Honey Bee) Anaphylaxis and Shortness of breath    Cefuroxime Hives and Unknown    Duricef [Cefadroxil] Hives and Unknown    Penicillins Hives and Unknown    Sulfa (Sulfonamide Antibiotics) Hives and Unknown       Prior to Admission medications    Medication Sig Start Date End Date Taking? Authorizing Provider   calcium carbonate-vitamin D3 600 mg-5 mcg (200 unit) tablet Take 2 tablets by mouth once daily. 10/25/19   Historical Provider, MD   chlorhexidine (Peridex) 0.12 % solution Swish for 30 seconds and spit 15mL of solution the night before and morning of surgery 10/18/24   MISTY Nobles-CNP   ibuprofen 200 mg tablet Take 1 tablet (200 mg) by mouth every 6 hours.    Historical Provider, MD   meloxicam (Mobic) 15 mg tablet Take 1 tablet (15 mg) by  mouth once daily. Dr Salcedo 5/2/24   Rylan Salcedo MD   multivitamin tablet Take 1 tablet by mouth once daily. 10/25/19   Historical Provider, MD   ramipril (Altace) 10 mg capsule Take 1 capsule (10 mg) by mouth once daily. Dr Salcedo 5/2/24   Rylan Salcedo MD        PAT ROS     DASI Risk Score    No data to display       Caprini DVT Assessment    No data to display       Modified Frailty Index    No data to display       CHADS2 Stroke Risk  Current as of 24 minutes ago        N/A 3 to 100%: High Risk   2 to < 3%: Medium Risk   0 to < 2%: Low Risk     Last Change: N/A          This score determines the patient's risk of having a stroke if the patient has atrial fibrillation.        This score is not applicable to this patient. Components are not calculated.          Revised Cardiac Risk Index    No data to display       Apfel Simplified Score    No data to display       Risk Analysis Index Results This Encounter    No data found in the last 10 encounters.       After Visit Summary (AVS) reviewed and patient verbalized good understanding of medications and NPO instructions.  Pre-op infection prevention measures:  CHG showers and mouthwash reviewed, understanding voiced.  CHG soap given and patient verbalized need to pick CHG mouthwash at their preferred local pharmacy.     Nurse Plan of Action:

## 2024-10-18 NOTE — CPM/PAT H&P
[facility] Evaluation       Name: Janet Santillan (Janet Santillan)  /Age: 1960/63 y.o.         Date of Consult: 10/18/24    Referring Provider: Dr. Elaine    Surgery, Date, and Length:   L4-S1 Lumbar Spine Decompression Revision; Transforaminal Lumbar Interbody Fusion; Instrumentation; Cement, 10/28/24, 210 min Mamadou Elaine        Janet Santillan is a 63 year-old female who presents to the [facility] for perioperative risk assessment prior to surgery.    Patient presents with a primary diagnosis of Pseudoclaudication syndrome  Lumbar radiculopathy   Ruptured lumbar disc     She continues to have back pain and right leg pain.  Past medical history significant for remote prior decompression at L4-5 and L5-S1 years ago.  She has now completed 6 weeks of physical therapy [facility].  Despite that therapy, her symptoms persist.  Her quality of life is intolerable.       This note was created in part upon personal review of patient's medical records.      Patient is scheduled to have a L4-S1 Lumbar Spine Decompression Revision; Transforaminal Lumbar Interbody Fusion; Instrumentation; Cement.    +PONV  Pt denies any past history of anesthetic complications such as , awareness, prolonged sedation, dental damage, aspiration, cardiac arrest, difficult intubation, difficult I.V. access or unexpected hospital admissions.  NO malignant hyperthermia and or pseudocholinesterase deficiency.  No history of blood transfusions     The patient is not a [employer] and will accept blood and blood products if medically indicated.   Type and screen sent.     Past Medical History:   Diagnosis Date    Other conditions influencing health status     No significant past medical history    PONV (postoperative nausea and vomiting)     Truncal muscle weakness 2024    Weakness of right leg 2024       Past Surgical History:   Procedure Laterality Date    CARPAL TUNNEL RELEASE Bilateral     OTHER  SURGICAL HISTORY  10/28/2019    Plantar fasciotomy    OTHER SURGICAL HISTORY  10/28/2019    Facial surgery    OTHER SURGICAL HISTORY  10/28/2019    Hysterectomy total with unilateral removal of ovary    OTHER SURGICAL HISTORY  10/28/2019    Tonsillectomy    OTHER SURGICAL HISTORY  02/04/2021    Back surgery       Patient  has no history on file for sexual activity.    Family History   Problem Relation Name Age of Onset    Hypotension Mother      Heart attack Mother      Other (bladder cancer) Mother      Diabetes Father      Hypertension Father      Heart attack Father      Coronary artery disease Sister      Heart attack Sister      Breast cancer Neg Hx         Social History     Socioeconomic History    Marital status:    Tobacco Use    Smoking status: Never    Smokeless tobacco: Never   Vaping Use    Vaping status: Never Used   Substance and Sexual Activity    Alcohol use: Yes     Comment: maybe 1 drink a year    Drug use: Never         Allergies   Allergen Reactions    Bee Venom Protein (Honey Bee) Anaphylaxis and Shortness of breath    Cefuroxime Hives and Unknown    Duricef [Cefadroxil] Hives and Unknown    Penicillins Hives and Unknown    Sulfa (Sulfonamide Antibiotics) Hives and Unknown         Current Outpatient Medications:     calcium carbonate-vitamin D3 600 mg-5 mcg (200 unit) tablet, Take 2 tablets by mouth once daily., Disp: , Rfl:     ibuprofen 200 mg tablet, Take 1 tablet (200 mg) by mouth every 6 hours., Disp: , Rfl:     meloxicam (Mobic) 15 mg tablet, Take 1 tablet (15 mg) by mouth once daily. Dr Salcedo, Disp: 90 tablet, Rfl: 1    multivitamin tablet, Take 1 tablet by mouth once daily., Disp: , Rfl:     ramipril (Altace) 10 mg capsule, Take 1 capsule (10 mg) by mouth once daily. Dr Salcedo, Disp: 90 capsule, Rfl: 1    chlorhexidine (Peridex) 0.12 % solution, Swish for 30 seconds and spit 15mL of solution the night before and morning of surgery, Disp: 475 mL, Rfl: 0       PAT ROS:  "  Constitutional:   neg    Neuro/Psych:   neg    Eyes:    use of corrective lenses  Ears:    no hearing aides  Nose:   Mouth:    Two top left and two top right teeth missing  Throat:   Neck:   neg    Cardio:    Functional 4 Mets. Patient denies SOB walking up 2 flights of stairs /walking/ yard work/house work  neg     no chest pain   no palpitations   no dyspnea   no FIGUEROA  Respiratory:   neg    Endocrine:   neg    GI:   neg     no abdominal pain   no constipation   no diarrhea   no nausea   no vomiting  :   neg    Musculoskeletal:    Lower back pain/right leg pain and across right foot  Hematologic:    bruises/bleeds easily   no history of blood transfusion   no blood clots  Skin:  neg        Physical Exam  Physical exam within normal limits.   Musculoskeletal:      Comments: Limited rom due to back pain and leg pain/weakness          PAT AIRWAY:   Airway:     Mallampati::  III    Neck ROM::  Full   No broken teeth, no dentures and some missing teeth          Visit Vitals  /86   Pulse 76   Temp 35.8 °C (96.4 °F)   Resp 18   Ht 1.54 m (5' 0.63\")   Wt 82 kg (180 lb 12.4 oz)   SpO2 97%   BMI 34.58 kg/m²   OB Status Hysterectomy   Smoking Status Never   BSA 1.87 m²        Plan    Cardiovascular:  Patient denies any chest pain, tightness, heaviness, pressure, radiating pain, palpitations, irregular heartbeats, lightheadedness, cough, congestion, shortness of breath, FIGUEROA, PND, near syncope, weight loss or gain.    HTN  Altace-Hold any evening dose the night before the day of surgery. Hold the day of surgery.    RCRI: 0  Risk of Mace: 3.9%    EKG   Encounter Date: 10/18/24   ECG 12 Lead   Result Value    Ventricular Rate 59    Atrial Rate 59    MO Interval 186    QRS Duration 138    QT Interval 440    QTC Calculation(Bazett) 435    P Axis 40    R Axis -25    T Axis 23    QRS Count 9    Q Onset 224    P Onset 131    P Offset 184    T Offset 444    QTC Fredericia 437    Narrative    Sinus bradycardia  Right bundle " branch block  Abnormal ECG  When compared with ECG of 14-FEB-2023 08:32,  PREVIOUS ECG IS PRESENT        Pulm:  Denies any shortness of breath or activity intolerance    Stop Bang= 3 (age, bmi, htn) intermediate risk    Heme:  Patient instructed to ambulate as soon as possible postoperatively to decrease thromboembolic risk.    Initiate mechanical DVT prophylaxis as soon as possible and initiate chemical prophylaxis when deemed safe from a bleeding standpoint post surgery.    Caprini=5    Risk assessment complete.  Patient is scheduled for a high surgical risk procedure.        Labs/testing obtained in PAT on 10/18/24  CBC, BMP, Coag, MRSA, T&S, HgA1c  Lab Results   Component Value Date    WBC 7.8 10/18/2024    HGB 13.5 10/18/2024    HCT 41.7 10/18/2024    MCV 83 10/18/2024     10/18/2024     Lab Results   Component Value Date    GLUCOSE 95 10/18/2024    CALCIUM 9.4 10/18/2024     10/18/2024    K 4.7 10/18/2024    CO2 29 10/18/2024     10/18/2024    BUN 17 10/18/2024    CREATININE 0.84 10/18/2024     Lab Results   Component Value Date    INR 0.9 10/18/2024    PROTIME 10.1 10/18/2024     Lab Results   Component Value Date    HGBA1C 5.7 (H) 10/18/2024      Labs reviewed, unremarkable.    Follow up: MRSA    Preoperative medication instructions were provided and reviewed with the patient.  Any additional testing or evaluation was explained to the patient.  Nothing by mouth instructions were discussed and patient's questions were answered prior to conclusion to this encounter.  Patient verbalized understanding of preoperative instructions given in preadmission testing; discharge instructions available in EMR.    This note was dictated with speech recognition.  Minor errors may have been detected during use of speech recognition.

## 2024-10-18 NOTE — CPM/PAT NURSE NOTE
CPM/PAT Nurse Note      Name: Janet Santillan (Janet Santillan)  /Age: 1960/63 y.o.       Past Medical History:   Diagnosis Date    Hypertension     Other conditions influencing health status     No significant past medical history    PONV (postoperative nausea and vomiting)     Truncal muscle weakness 2024    Weakness of right leg 2024       Past Surgical History:   Procedure Laterality Date    CARPAL TUNNEL RELEASE Bilateral     OTHER SURGICAL HISTORY  10/28/2019    Plantar fasciotomy    OTHER SURGICAL HISTORY  10/28/2019    Facial surgery    OTHER SURGICAL HISTORY  10/28/2019    Hysterectomy total with unilateral removal of ovary    OTHER SURGICAL HISTORY  10/28/2019    Tonsillectomy    OTHER SURGICAL HISTORY  2021    Back surgery       Patient  has no history on file for sexual activity.    Family History   Problem Relation Name Age of Onset    Hypotension Mother      Heart attack Mother      Other (bladder cancer) Mother      Diabetes Father      Hypertension Father      Heart attack Father      Coronary artery disease Sister      Heart attack Sister      Breast cancer Neg Hx         Allergies   Allergen Reactions    Bee Venom Protein (Honey Bee) Anaphylaxis and Shortness of breath    Cefuroxime Hives and Unknown    Duricef [Cefadroxil] Hives and Unknown    Penicillins Hives and Unknown    Sulfa (Sulfonamide Antibiotics) Hives and Unknown       Prior to Admission medications    Medication Sig Start Date End Date Taking? Authorizing Provider   calcium carbonate-vitamin D3 600 mg-5 mcg (200 unit) tablet Take 2 tablets by mouth once daily. 10/25/19   Historical Provider, MD   chlorhexidine (Peridex) 0.12 % solution Swish for 30 seconds and spit 15mL of solution the night before and morning of surgery 10/18/24   MISTY Nobles-CNP   ibuprofen 200 mg tablet Take 1 tablet (200 mg) by mouth every 6 hours.    Historical Provider, MD   meloxicam (Mobic) 15 mg tablet Take 1 tablet (15 mg) by  mouth once daily. Dr Salcedo 5/2/24   Rylan Salcedo MD   multivitamin tablet Take 1 tablet by mouth once daily. 10/25/19   Historical Provider, MD   ramipril (Altace) 10 mg capsule Take 1 capsule (10 mg) by mouth once daily. Dr Salcedo 5/2/24   Rylan Salcedo MD        PAT ROS     DASI Risk Score    No data to display       Caprini DVT Assessment    No data to display       Modified Frailty Index    No data to display       CHADS2 Stroke Risk  Current as of 24 minutes ago        N/A 3 to 100%: High Risk   2 to < 3%: Medium Risk   0 to < 2%: Low Risk     Last Change: N/A          This score determines the patient's risk of having a stroke if the patient has atrial fibrillation.        This score is not applicable to this patient. Components are not calculated.          Revised Cardiac Risk Index    No data to display       Apfel Simplified Score    No data to display       Risk Analysis Index Results This Encounter    No data found in the last 10 encounters.       After Visit Summary (AVS) reviewed and patient verbalized good understanding of medications and NPO instructions.     Nurse Plan of Action:

## 2024-10-18 NOTE — PREPROCEDURE INSTRUCTIONS
Medication List            Accurate as of October 18, 2024  9:51 AM. Always use your most recent med list.                calcium carbonate-vitamin D3 600 mg-5 mcg (200 unit) tablet  Medication Adjustments for Surgery: Do Not take on the morning of surgery     chlorhexidine 0.12 % solution  Commonly known as: Peridex  Swish for 30 seconds and spit 15mL of solution the night before and morning of surgery     ibuprofen 200 mg tablet  Additional Medication Adjustments for Surgery: Take last dose 7 days before surgery     meloxicam 15 mg tablet  Commonly known as: Mobic  Take 1 tablet (15 mg) by mouth once daily. Dr Salcedo  Additional Medication Adjustments for Surgery: Take last dose 7 days before surgery     multivitamin tablet  Additional Medication Adjustments for Surgery: Take last dose 7 days before surgery     ramipril 10 mg capsule  Commonly known as: Altace  Take 1 capsule (10 mg) by mouth once daily. Dr Salcedo  Notes to patient: Hold any evening dose the night before the day of surgery. Hold the day of surgery.                     **Concerning above medication instructions, if medication is normally taken at night, continue normal schedule.**  **DO NOT TAKE NIGHT PRIOR AND MORNING OF SURGERY**    CONTACT SURGEON'S OFFICE IF YOU DEVELOP:  * Fever = 100.4 F   * New respiratory symptoms (e.g. cough, shortness of breath, respiratory distress, sore throat)  * Recent loss of taste or smell  *Flu like symptoms such as headache, fatigue or gastrointestinal symptoms  * You develop any open sores, shingles, burning or painful urination   AND/OR:  * You no longer wish to have the surgery.  * Any other personal circumstances change that may lead to the need to cancel or defer this surgery.  *You were admitted to any hospital within one week of your planned procedure.    SMOKING:  *Quitting smoking can make a huge difference to your health and recovery from surgery.    *If you need help with quitting, call  1-800-QUIT-NOW.    THE DAY BEFORE SURGERY:  *Do not eat any food after midnight the night before surgery.   *You are permitted to drink clear liquids (i.e. water, black coffee (no milk or cream), tea, apple juice and electrolyte drinks (gatorade)) up to 13.5 ounces, up to 2 hours before your arrival time.  *You may chew gum until 2 hours before your surgery    SURGICAL TIME  *You will be contacted between 2 p.m. and 6 p.m. the business day before your surgery with your arrival time.  *If you haven't received a call by 6pm, call [phone].  *Scheduled surgery times may change and you will be notified if this occurs-check your personal voicemail for any updates.    ON THE MORNING OF SURGERY:  *Wear comfortable, loose fitting clothing.   *Do not use moisturizers, creams, lotions or perfume.  *All jewelry and valuables should be left at home.  *Prosthetic devices such as contact lenses, hearing aids, dentures, eyelash extensions, hairpins and body piercing must be removed before surgery.    BRING WITH YOU:  *Photo ID and insurance card  *Current list of medicines and allergies  *Pacemaker/Defibrillator/Heart stent cards  *CPAP machine and mask  *Slings/splints/crutches  *Copy of your complete Advanced Directive/DHPOA-if applicable  *Neurostimulator implant remote    PARKING AND ARRIVAL:  *Check in at the Main Entrance desk and let them know you are here for surgery.  *You will be directed to the 2nd floor surgical waiting area.    AFTER OUTPATIENT SURGERY:  *A responsible adult MUST accompany you at the time of discharge and stay with you for 24 hours after your surgery.  *You may NOT drive yourself home after surgery.  *You may use a taxi or ride sharing service ([employer], Uber) to return home ONLY if you are accompanied by a friend or family member.  *Instructions for resuming your medications will be provided by your surgeon.         Patient Information: Oral/Dental Rinse  **This is a prescription; pick it up at your  preferred local pharmacy **  What is oral/dental rinse?   It is a mouthwash. It is a way of cleaning the mouth with a germ killing solution before your surgery.  The solution contains chlorhexidine, commonly known as CHG.   It is used inside the mouth to kill a bacteria known as Staphylococcus aureus.  Let your doctor know if you are allergic to Chlorhexidine.    Why do I need to use CHG oral/dental rinse?  The CHG oral/dental rinse helps to kill a bacteria in your mouth known a Staphylococcus aureus.     This reduces the risk of infection at the surgical site.      Using your CHG oral/dental rinse  STEPS:  Use your CHG oral/dental rinse after you brush your teeth the night before (at bedtime) and the morning of your surgery.  Follow all directions on your prescription label.    Use the cap on the container to measure 15ml (fill cap to fill line)  Swish (gargle if you can) the mouthwash in your mouth for at least 30 seconds, (do not to swallow) spit out  After you use your CHG rinse, do not rinse your mouth with water, drink or eat.  Please refer to prescription label for the appropriate time to resume oral intake  Dental rinse comes in one size bottle: 473ml ~16oz.  You will have leftover    rinse, discard after this use.    What side effects might I have using the CHG oral/dental rinse?  CHG rinse will stick to plaque on the teeth.  Brush and floss just before use.  Teeth brushing will help avoid staining of plaque during use.    Who should I contact if I have questions about the CHG oral/dental rinse?  Please call [facility], Preadmission Testing at [phone] if you have any questions    Home Preoperative Antibacterial Shower     What is a home preoperative antibacterial shower?  This shower is a way of cleaning the skin with a germ killing soap before surgery.  The soap contains chlorhexidine, commonly known as CHG.  CHG is a soap for your skin with germ killing ability.  Let  your doctor know if you are allergic to chlorhexidine.    Why do I need to take a preoperative antibacterial shower?  Skin is not sterile.  It is best to try to make your skin as free of germs as possible before surgery.  Proper cleansing with a germ killing soap before surgery can lower the number of germs on your skin.  This helps to reduce the risk of infection at the surgical site.  Following the instructions listed below will help you prepare your skin for surgery.      How do I use the CHG skin cleanser?  Steps:  Begin using your CHG soap five days before your scheduled surgery on ________________________.    Days 1-4 Shower before bed:  Wash your face and genitals with your normal soap and rinse.    Wash and rinse your hair using the CHG soap. Rinse completely, do not condition your   Hair.          3.    Apply the CHG soap to a clean wet washcloth.  Turn the water off or move away                From the water spray to avoid premature rinsing of the CHG soap as you are applying.     4.   Lather your entire body from the neck down.  Do not use on your face or genitals.   Pay special attention to the area(s) where your incision(s) will be located unless they are on your face.  Avoid scrubbing your skin too hard.  The important point is to have the CHG soap sit on your skin for 3 minutes.    When the 3 minutes are up, turn on the water and rinse the CHG soap off your body completely.   Pat yourself dry with a clean, freshly-laundered towel.  Dress in clean, freshly laundered night clothes.    Be sure to sleep with clean, freshly laundered sheets.  Day 5:  Last shower is the morning before surgery: Follow above Instructions.    NOTE:    *Hair extensions should be removed    *Keep CHG soap out of eyes and ear canals   *DO NOT wash with regular soap on your body after you have used the CHG        soap solution  *DO NOT apply powders, lotions, or perfume.  *Deodorant may be used days 1-4, BUT NOT the day of  surgery    Who should I contact if I have any questions regarding the use of CHG soap?  Call the [facility], Preadmission Testing at [phone] if you have any questions.              Patient Information: Pre-Operative Infection Prevention Measures     Why did I have my nose, under my arms and groin swabbed?  The purpose of the swab is to identify Staphylococcus aureus inside your nose or on your skin.  The swab was sent to the laboratory for culture.  A positive swab/culture for Staphylococcus aureus is called colonization or carriage.      What is Staphylococcus aureus?  Staphylococcus aureus, also known as “staph”, is a germ found on the skin or in the nose of healthy people.  Sometimes Staphylococcus aureus can get into the body and cause an infection.  This can be minor (such as pimples, boils or other skin problems).  It might also be serious (such as blood infection, pneumonia or a surgical site infection).    What is Staphylococcus aureus colonization or carriage?  Colonization or carriage means that a person has the germ but is not sick from it.  These bacteria can be spread on the hands or when breathing or sneezing.    How is Staphylococcus aureus spread?  It is most often spread by close contact with a person or item that carries it.    What happens if my culture is positive for Staphylococcus aureus?  Your doctor/medical team will use this information to guide any antibiotic treatment which may be necessary.  Regardless of the culture results, we will clean the inside of your nose with a betadine swab just before you have your surgery.      Will I get an infection if I have Staphylococcus aureus in my nose or on my skin?  Anyone can get an infection with Staphylococcus aureus.  However, the best way to reduce your risk of infection is to follow the instructions provided to you for the use of your CHG soap and dental rinse.        Who should I contact if I have any  questions?  Call the [facility], Preadmission Testing at [phone] if you have any questions.

## 2024-10-20 LAB
ATRIAL RATE: 59 BPM
P AXIS: 40 DEGREES
P OFFSET: 184 MS
P ONSET: 131 MS
PR INTERVAL: 186 MS
Q ONSET: 224 MS
QRS COUNT: 9 BEATS
QRS DURATION: 138 MS
QT INTERVAL: 440 MS
QTC CALCULATION(BAZETT): 435 MS
QTC FREDERICIA: 437 MS
R AXIS: -25 DEGREES
STAPHYLOCOCCUS SPEC CULT: NORMAL
T AXIS: 23 DEGREES
T OFFSET: 444 MS
VENTRICULAR RATE: 59 BPM

## 2024-10-27 ENCOUNTER — ANESTHESIA EVENT (OUTPATIENT)
Dept: OPERATING ROOM | Facility: HOSPITAL | Age: 64
End: 2024-10-27
Payer: COMMERCIAL

## 2024-10-28 ENCOUNTER — PHARMACY VISIT (OUTPATIENT)
Dept: PHARMACY | Facility: CLINIC | Age: 64
End: 2024-10-28
Payer: COMMERCIAL

## 2024-10-28 ENCOUNTER — HOSPITAL ENCOUNTER (OUTPATIENT)
Facility: HOSPITAL | Age: 64
Discharge: HOME | End: 2024-10-29
Attending: ORTHOPAEDIC SURGERY | Admitting: ORTHOPAEDIC SURGERY
Payer: COMMERCIAL

## 2024-10-28 ENCOUNTER — ANESTHESIA (OUTPATIENT)
Dept: OPERATING ROOM | Facility: HOSPITAL | Age: 64
End: 2024-10-28
Payer: COMMERCIAL

## 2024-10-28 ENCOUNTER — APPOINTMENT (OUTPATIENT)
Dept: RADIOLOGY | Facility: HOSPITAL | Age: 64
End: 2024-10-28
Payer: COMMERCIAL

## 2024-10-28 DIAGNOSIS — M54.16 LUMBAR RADICULOPATHY: Primary | ICD-10-CM

## 2024-10-28 DIAGNOSIS — G89.18 POSTOPERATIVE PAIN AFTER SPINAL SURGERY: ICD-10-CM

## 2024-10-28 DIAGNOSIS — R11.0 POSTOPERATIVE NAUSEA: ICD-10-CM

## 2024-10-28 DIAGNOSIS — Z98.890 POSTOPERATIVE NAUSEA: ICD-10-CM

## 2024-10-28 DIAGNOSIS — T40.2X5A CONSTIPATION DUE TO OPIOID THERAPY: ICD-10-CM

## 2024-10-28 DIAGNOSIS — K59.03 CONSTIPATION DUE TO OPIOID THERAPY: ICD-10-CM

## 2024-10-28 DIAGNOSIS — M51.26 RUPTURED LUMBAR DISC: ICD-10-CM

## 2024-10-28 DIAGNOSIS — M48.062 PSEUDOCLAUDICATION SYNDROME: ICD-10-CM

## 2024-10-28 LAB
ABO GROUP (TYPE) IN BLOOD: NORMAL
RH FACTOR (ANTIGEN D): NORMAL

## 2024-10-28 PROCEDURE — 7100000010 HC PHASE TWO TIME - EACH INCREMENTAL 1 MINUTE: Performed by: ORTHOPAEDIC SURGERY

## 2024-10-28 PROCEDURE — 63053 LAM FACTC/FRMT ARTHRD LUM EA: CPT | Performed by: ORTHOPAEDIC SURGERY

## 2024-10-28 PROCEDURE — 63052 LAM FACETC/FRMT ARTHRD LUM 1: CPT | Performed by: PHYSICIAN ASSISTANT

## 2024-10-28 PROCEDURE — 9420000001 HC RT PATIENT EDUCATION 5 MIN

## 2024-10-28 PROCEDURE — C1769 GUIDE WIRE: HCPCS | Performed by: ORTHOPAEDIC SURGERY

## 2024-10-28 PROCEDURE — 7100000011 HC EXTENDED STAY RECOVERY HOURLY - NURSING UNIT

## 2024-10-28 PROCEDURE — 22630 ARTHRD PST TQ 1NTRSPC LUM: CPT | Performed by: PHYSICIAN ASSISTANT

## 2024-10-28 PROCEDURE — 2780000003 HC OR 278 NO HCPCS: Performed by: ORTHOPAEDIC SURGERY

## 2024-10-28 PROCEDURE — 2500000005 HC RX 250 GENERAL PHARMACY W/O HCPCS: Performed by: ANESTHESIOLOGY

## 2024-10-28 PROCEDURE — C1713 ANCHOR/SCREW BN/BN,TIS/BN: HCPCS | Performed by: ORTHOPAEDIC SURGERY

## 2024-10-28 PROCEDURE — 3700000001 HC GENERAL ANESTHESIA TIME - INITIAL BASE CHARGE: Performed by: ORTHOPAEDIC SURGERY

## 2024-10-28 PROCEDURE — 2500000001 HC RX 250 WO HCPCS SELF ADMINISTERED DRUGS (ALT 637 FOR MEDICARE OP): Performed by: PHYSICIAN ASSISTANT

## 2024-10-28 PROCEDURE — 22842 INSERT SPINE FIXATION DEVICE: CPT | Performed by: PHYSICIAN ASSISTANT

## 2024-10-28 PROCEDURE — 7100000009 HC PHASE TWO TIME - INITIAL BASE CHARGE: Performed by: ORTHOPAEDIC SURGERY

## 2024-10-28 PROCEDURE — 20930 SP BONE ALGRFT MORSEL ADD-ON: CPT | Performed by: ORTHOPAEDIC SURGERY

## 2024-10-28 PROCEDURE — 22853 INSJ BIOMECHANICAL DEVICE: CPT | Performed by: PHYSICIAN ASSISTANT

## 2024-10-28 PROCEDURE — 22853 INSJ BIOMECHANICAL DEVICE: CPT | Performed by: ORTHOPAEDIC SURGERY

## 2024-10-28 PROCEDURE — 2500000004 HC RX 250 GENERAL PHARMACY W/ HCPCS (ALT 636 FOR OP/ED): Performed by: ANESTHESIOLOGIST ASSISTANT

## 2024-10-28 PROCEDURE — 20939 BONE MARROW ASPIR BONE GRFG: CPT | Performed by: ORTHOPAEDIC SURGERY

## 2024-10-28 PROCEDURE — 63053 LAM FACTC/FRMT ARTHRD LUM EA: CPT | Performed by: PHYSICIAN ASSISTANT

## 2024-10-28 PROCEDURE — 3600000009 HC OR TIME - EACH INCREMENTAL 1 MINUTE - PROCEDURE LEVEL FOUR: Performed by: ORTHOPAEDIC SURGERY

## 2024-10-28 PROCEDURE — C1763 CONN TISS, NON-HUMAN: HCPCS | Performed by: ORTHOPAEDIC SURGERY

## 2024-10-28 PROCEDURE — 97530 THERAPEUTIC ACTIVITIES: CPT | Mod: GP

## 2024-10-28 PROCEDURE — 3600000004 HC OR TIME - INITIAL BASE CHARGE - PROCEDURE LEVEL FOUR: Performed by: ORTHOPAEDIC SURGERY

## 2024-10-28 PROCEDURE — 22632 ARTHRD PST TQ 1NTRSPC LM EA: CPT | Performed by: ORTHOPAEDIC SURGERY

## 2024-10-28 PROCEDURE — 20936 SP BONE AGRFT LOCAL ADD-ON: CPT | Performed by: ORTHOPAEDIC SURGERY

## 2024-10-28 PROCEDURE — 76000 FLUOROSCOPY <1 HR PHYS/QHP: CPT | Mod: 59

## 2024-10-28 PROCEDURE — 22630 ARTHRD PST TQ 1NTRSPC LUM: CPT | Performed by: ORTHOPAEDIC SURGERY

## 2024-10-28 PROCEDURE — 2500000004 HC RX 250 GENERAL PHARMACY W/ HCPCS (ALT 636 FOR OP/ED): Performed by: PHYSICIAN ASSISTANT

## 2024-10-28 PROCEDURE — 2500000001 HC RX 250 WO HCPCS SELF ADMINISTERED DRUGS (ALT 637 FOR MEDICARE OP): Performed by: ANESTHESIOLOGY

## 2024-10-28 PROCEDURE — 7100000001 HC RECOVERY ROOM TIME - INITIAL BASE CHARGE: Performed by: ORTHOPAEDIC SURGERY

## 2024-10-28 PROCEDURE — 22632 ARTHRD PST TQ 1NTRSPC LM EA: CPT | Performed by: PHYSICIAN ASSISTANT

## 2024-10-28 PROCEDURE — 36415 COLL VENOUS BLD VENIPUNCTURE: CPT | Performed by: ORTHOPAEDIC SURGERY

## 2024-10-28 PROCEDURE — C1776 JOINT DEVICE (IMPLANTABLE): HCPCS | Performed by: ORTHOPAEDIC SURGERY

## 2024-10-28 PROCEDURE — 7100000024 HC EXTENDED STAY RECOVERY PER MINUTE- PACU: Performed by: ORTHOPAEDIC SURGERY

## 2024-10-28 PROCEDURE — 2500000005 HC RX 250 GENERAL PHARMACY W/O HCPCS: Performed by: ANESTHESIOLOGIST ASSISTANT

## 2024-10-28 PROCEDURE — 2500000004 HC RX 250 GENERAL PHARMACY W/ HCPCS (ALT 636 FOR OP/ED): Performed by: ANESTHESIOLOGY

## 2024-10-28 PROCEDURE — C1889 IMPLANT/INSERT DEVICE, NOC: HCPCS | Performed by: ORTHOPAEDIC SURGERY

## 2024-10-28 PROCEDURE — 63052 LAM FACETC/FRMT ARTHRD LUM 1: CPT | Performed by: ORTHOPAEDIC SURGERY

## 2024-10-28 PROCEDURE — RXMED WILLOW AMBULATORY MEDICATION CHARGE

## 2024-10-28 PROCEDURE — 2500000005 HC RX 250 GENERAL PHARMACY W/O HCPCS: Performed by: ORTHOPAEDIC SURGERY

## 2024-10-28 PROCEDURE — 97162 PT EVAL MOD COMPLEX 30 MIN: CPT | Mod: GP

## 2024-10-28 PROCEDURE — A22630 PR ARTHRODESIS POSTERIOR INTERBODY LUMBAR: Performed by: ANESTHESIOLOGIST ASSISTANT

## 2024-10-28 PROCEDURE — 2720000007 HC OR 272 NO HCPCS: Performed by: ORTHOPAEDIC SURGERY

## 2024-10-28 PROCEDURE — 7100000002 HC RECOVERY ROOM TIME - EACH INCREMENTAL 1 MINUTE: Performed by: ORTHOPAEDIC SURGERY

## 2024-10-28 PROCEDURE — A22630 PR ARTHRODESIS POSTERIOR INTERBODY LUMBAR: Performed by: ANESTHESIOLOGY

## 2024-10-28 PROCEDURE — 22842 INSERT SPINE FIXATION DEVICE: CPT | Performed by: ORTHOPAEDIC SURGERY

## 2024-10-28 PROCEDURE — 3700000002 HC GENERAL ANESTHESIA TIME - EACH INCREMENTAL 1 MINUTE: Performed by: ORTHOPAEDIC SURGERY

## 2024-10-28 DEVICE — SPACER 6068073 CATALYFT PL SHORT 7MM
Type: IMPLANTABLE DEVICE | Site: SPINE LUMBAR | Status: FUNCTIONAL
Brand: CATALYFT PL EXPANDABLE INTERBODY SYSTEM

## 2024-10-28 DEVICE — IMPLANTABLE DEVICE: Type: IMPLANTABLE DEVICE | Site: SPINE LUMBAR | Status: FUNCTIONAL

## 2024-10-28 DEVICE — SCREW SET, SOLERA VOYAGER, 5.5/6.0: Type: IMPLANTABLE DEVICE | Site: SPINE LUMBAR | Status: FUNCTIONAL

## 2024-10-28 DEVICE — GUIDEWIRE, SEXTANT, BLUNT: Type: IMPLANTABLE DEVICE | Site: SPINE LUMBAR | Status: NON-FUNCTIONAL

## 2024-10-28 DEVICE — DURAGEN® PLUS DURAL REGENERATION MATRIX, 1 IN X 1 IN (2.5 CM X 2.5 CM)
Type: IMPLANTABLE DEVICE | Site: SPINE LUMBAR | Status: FUNCTIONAL
Brand: DURAGEN® PLUS

## 2024-10-28 DEVICE — BONE CHIPS, CANCELL 15CC 1-4MM: Type: IMPLANTABLE DEVICE | Site: SPINE LUMBAR | Status: FUNCTIONAL

## 2024-10-28 RX ORDER — PROCHLORPERAZINE MALEATE 10 MG
10 TABLET ORAL EVERY 6 HOURS PRN
Status: DISCONTINUED | OUTPATIENT
Start: 2024-10-28 | End: 2024-10-29 | Stop reason: HOSPADM

## 2024-10-28 RX ORDER — NALOXONE HYDROCHLORIDE 0.4 MG/ML
0.2 INJECTION, SOLUTION INTRAMUSCULAR; INTRAVENOUS; SUBCUTANEOUS EVERY 5 MIN PRN
Status: DISCONTINUED | OUTPATIENT
Start: 2024-10-28 | End: 2024-10-29 | Stop reason: HOSPADM

## 2024-10-28 RX ORDER — DOCUSATE SODIUM 100 MG/1
100 CAPSULE, LIQUID FILLED ORAL 2 TIMES DAILY
Status: DISCONTINUED | OUTPATIENT
Start: 2024-10-28 | End: 2024-10-29 | Stop reason: HOSPADM

## 2024-10-28 RX ORDER — OXYCODONE HYDROCHLORIDE 5 MG/1
10 TABLET ORAL EVERY 4 HOURS PRN
Status: DISCONTINUED | OUTPATIENT
Start: 2024-10-28 | End: 2024-10-29 | Stop reason: HOSPADM

## 2024-10-28 RX ORDER — ACETAMINOPHEN 500 MG
500 TABLET ORAL EVERY 6 HOURS PRN
Qty: 40 TABLET | Refills: 0 | Status: SHIPPED | OUTPATIENT
Start: 2024-10-28 | End: 2024-11-07

## 2024-10-28 RX ORDER — OXYCODONE AND ACETAMINOPHEN 5; 325 MG/1; MG/1
1 TABLET ORAL ONCE
Status: CANCELLED | OUTPATIENT
Start: 2024-10-28

## 2024-10-28 RX ORDER — SYRING-NEEDL,DISP,INSUL,0.3 ML 29 G X1/2"
296 SYRINGE, EMPTY DISPOSABLE MISCELLANEOUS ONCE AS NEEDED
Status: DISCONTINUED | OUTPATIENT
Start: 2024-10-28 | End: 2024-10-29 | Stop reason: HOSPADM

## 2024-10-28 RX ORDER — PROPOFOL 10 MG/ML
INJECTION, EMULSION INTRAVENOUS AS NEEDED
Status: DISCONTINUED | OUTPATIENT
Start: 2024-10-28 | End: 2024-10-28

## 2024-10-28 RX ORDER — ONDANSETRON HYDROCHLORIDE 2 MG/ML
INJECTION, SOLUTION INTRAVENOUS AS NEEDED
Status: DISCONTINUED | OUTPATIENT
Start: 2024-10-28 | End: 2024-10-28

## 2024-10-28 RX ORDER — KETOROLAC TROMETHAMINE 10 MG/1
10 TABLET, FILM COATED ORAL EVERY 6 HOURS PRN
Qty: 15 TABLET | Refills: 0 | Status: SHIPPED | OUTPATIENT
Start: 2024-10-28 | End: 2024-11-02

## 2024-10-28 RX ORDER — LIDOCAINE HYDROCHLORIDE 20 MG/ML
INJECTION, SOLUTION INFILTRATION; PERINEURAL AS NEEDED
Status: DISCONTINUED | OUTPATIENT
Start: 2024-10-28 | End: 2024-10-28

## 2024-10-28 RX ORDER — LIDOCAINE HYDROCHLORIDE 10 MG/ML
0.1 INJECTION, SOLUTION EPIDURAL; INFILTRATION; INTRACAUDAL; PERINEURAL ONCE
Status: DISCONTINUED | OUTPATIENT
Start: 2024-10-28 | End: 2024-10-28 | Stop reason: HOSPADM

## 2024-10-28 RX ORDER — METHOCARBAMOL 500 MG/1
500 TABLET, FILM COATED ORAL 4 TIMES DAILY
Status: DISCONTINUED | OUTPATIENT
Start: 2024-10-28 | End: 2024-10-29 | Stop reason: HOSPADM

## 2024-10-28 RX ORDER — FENTANYL CITRATE 50 UG/ML
INJECTION, SOLUTION INTRAMUSCULAR; INTRAVENOUS AS NEEDED
Status: DISCONTINUED | OUTPATIENT
Start: 2024-10-28 | End: 2024-10-28

## 2024-10-28 RX ORDER — HYDROMORPHONE HYDROCHLORIDE 0.2 MG/ML
0.2 INJECTION INTRAMUSCULAR; INTRAVENOUS; SUBCUTANEOUS EVERY 4 HOURS PRN
Status: DISCONTINUED | OUTPATIENT
Start: 2024-10-28 | End: 2024-10-29 | Stop reason: HOSPADM

## 2024-10-28 RX ORDER — DEXAMETHASONE SODIUM PHOSPHATE 10 MG/ML
10 INJECTION INTRAMUSCULAR; INTRAVENOUS EVERY 8 HOURS SCHEDULED
Status: COMPLETED | OUTPATIENT
Start: 2024-10-28 | End: 2024-10-29

## 2024-10-28 RX ORDER — FENTANYL CITRATE 50 UG/ML
50 INJECTION, SOLUTION INTRAMUSCULAR; INTRAVENOUS EVERY 5 MIN PRN
Status: DISCONTINUED | OUTPATIENT
Start: 2024-10-28 | End: 2024-10-28 | Stop reason: HOSPADM

## 2024-10-28 RX ORDER — LISINOPRIL 20 MG/1
20 TABLET ORAL DAILY
Status: DISCONTINUED | OUTPATIENT
Start: 2024-10-29 | End: 2024-10-29 | Stop reason: HOSPADM

## 2024-10-28 RX ORDER — SODIUM CHLORIDE, SODIUM LACTATE, POTASSIUM CHLORIDE, CALCIUM CHLORIDE 600; 310; 30; 20 MG/100ML; MG/100ML; MG/100ML; MG/100ML
100 INJECTION, SOLUTION INTRAVENOUS CONTINUOUS
Status: DISCONTINUED | OUTPATIENT
Start: 2024-10-28 | End: 2024-10-28 | Stop reason: HOSPADM

## 2024-10-28 RX ORDER — FENTANYL CITRATE 50 UG/ML
12.5 INJECTION, SOLUTION INTRAMUSCULAR; INTRAVENOUS EVERY 5 MIN PRN
Status: DISCONTINUED | OUTPATIENT
Start: 2024-10-28 | End: 2024-10-28 | Stop reason: HOSPADM

## 2024-10-28 RX ORDER — ONDANSETRON HYDROCHLORIDE 2 MG/ML
4 INJECTION, SOLUTION INTRAVENOUS ONCE AS NEEDED
Status: DISCONTINUED | OUTPATIENT
Start: 2024-10-28 | End: 2024-10-28 | Stop reason: HOSPADM

## 2024-10-28 RX ORDER — PROCHLORPERAZINE EDISYLATE 5 MG/ML
10 INJECTION INTRAMUSCULAR; INTRAVENOUS EVERY 6 HOURS PRN
Status: DISCONTINUED | OUTPATIENT
Start: 2024-10-28 | End: 2024-10-29 | Stop reason: HOSPADM

## 2024-10-28 RX ORDER — DROPERIDOL 2.5 MG/ML
0.62 INJECTION, SOLUTION INTRAMUSCULAR; INTRAVENOUS ONCE AS NEEDED
Status: DISCONTINUED | OUTPATIENT
Start: 2024-10-28 | End: 2024-10-28 | Stop reason: HOSPADM

## 2024-10-28 RX ORDER — ONDANSETRON HYDROCHLORIDE 2 MG/ML
4 INJECTION, SOLUTION INTRAVENOUS EVERY 8 HOURS PRN
Status: DISCONTINUED | OUTPATIENT
Start: 2024-10-28 | End: 2024-10-29 | Stop reason: HOSPADM

## 2024-10-28 RX ORDER — ONDANSETRON 4 MG/1
4 TABLET, FILM COATED ORAL EVERY 8 HOURS PRN
Qty: 15 TABLET | Refills: 0 | Status: SHIPPED | OUTPATIENT
Start: 2024-10-28 | End: 2024-11-02

## 2024-10-28 RX ORDER — DOCUSATE SODIUM 100 MG/1
100 CAPSULE, LIQUID FILLED ORAL 2 TIMES DAILY
Qty: 20 CAPSULE | Refills: 0 | Status: SHIPPED | OUTPATIENT
Start: 2024-10-28 | End: 2024-11-07

## 2024-10-28 RX ORDER — METHOCARBAMOL 500 MG/1
500 TABLET, FILM COATED ORAL 4 TIMES DAILY PRN
Qty: 40 TABLET | Refills: 0 | Status: SHIPPED | OUTPATIENT
Start: 2024-10-28 | End: 2024-11-07

## 2024-10-28 RX ORDER — VANCOMYCIN HYDROCHLORIDE 500 MG/10ML
INJECTION, POWDER, LYOPHILIZED, FOR SOLUTION INTRAVENOUS AS NEEDED
Status: DISCONTINUED | OUTPATIENT
Start: 2024-10-28 | End: 2024-10-28

## 2024-10-28 RX ORDER — OXYCODONE HYDROCHLORIDE 5 MG/1
5 TABLET ORAL EVERY 6 HOURS PRN
Qty: 28 TABLET | Refills: 0 | Status: SHIPPED | OUTPATIENT
Start: 2024-10-28 | End: 2024-11-04

## 2024-10-28 RX ORDER — KETOROLAC TROMETHAMINE 30 MG/ML
30 INJECTION, SOLUTION INTRAMUSCULAR; INTRAVENOUS EVERY 6 HOURS PRN
Status: DISCONTINUED | OUTPATIENT
Start: 2024-10-28 | End: 2024-10-29 | Stop reason: HOSPADM

## 2024-10-28 RX ORDER — FENTANYL CITRATE 50 UG/ML
25 INJECTION, SOLUTION INTRAMUSCULAR; INTRAVENOUS EVERY 5 MIN PRN
Status: DISCONTINUED | OUTPATIENT
Start: 2024-10-28 | End: 2024-10-28 | Stop reason: HOSPADM

## 2024-10-28 RX ORDER — NORETHINDRONE AND ETHINYL ESTRADIOL 0.5-0.035
KIT ORAL AS NEEDED
Status: DISCONTINUED | OUTPATIENT
Start: 2024-10-28 | End: 2024-10-28

## 2024-10-28 RX ORDER — SODIUM CHLORIDE, SODIUM LACTATE, POTASSIUM CHLORIDE, CALCIUM CHLORIDE 600; 310; 30; 20 MG/100ML; MG/100ML; MG/100ML; MG/100ML
INJECTION, SOLUTION INTRAVENOUS CONTINUOUS PRN
Status: DISCONTINUED | OUTPATIENT
Start: 2024-10-28 | End: 2024-10-28

## 2024-10-28 RX ORDER — ONDANSETRON 4 MG/1
4 TABLET, FILM COATED ORAL EVERY 8 HOURS PRN
Status: DISCONTINUED | OUTPATIENT
Start: 2024-10-28 | End: 2024-10-29 | Stop reason: HOSPADM

## 2024-10-28 RX ORDER — LIDOCAINE 560 MG/1
PATCH PERCUTANEOUS; TOPICAL; TRANSDERMAL AS NEEDED
Status: DISCONTINUED | OUTPATIENT
Start: 2024-10-28 | End: 2024-10-28 | Stop reason: HOSPADM

## 2024-10-28 RX ORDER — BUPIVACAINE HCL/EPINEPHRINE 0.5-1:200K
VIAL (ML) INJECTION AS NEEDED
Status: DISCONTINUED | OUTPATIENT
Start: 2024-10-28 | End: 2024-10-28 | Stop reason: HOSPADM

## 2024-10-28 RX ORDER — OXYCODONE HYDROCHLORIDE 5 MG/1
5 TABLET ORAL EVERY 4 HOURS PRN
Status: DISCONTINUED | OUTPATIENT
Start: 2024-10-28 | End: 2024-10-28

## 2024-10-28 RX ORDER — CLINDAMYCIN PHOSPHATE 900 MG/50ML
900 INJECTION, SOLUTION INTRAVENOUS EVERY 8 HOURS
Status: COMPLETED | OUTPATIENT
Start: 2024-10-28 | End: 2024-10-29

## 2024-10-28 RX ORDER — SODIUM CHLORIDE 0.9 G/100ML
IRRIGANT IRRIGATION AS NEEDED
Status: DISCONTINUED | OUTPATIENT
Start: 2024-10-28 | End: 2024-10-28 | Stop reason: HOSPADM

## 2024-10-28 RX ORDER — MIDAZOLAM HYDROCHLORIDE 1 MG/ML
INJECTION INTRAMUSCULAR; INTRAVENOUS AS NEEDED
Status: DISCONTINUED | OUTPATIENT
Start: 2024-10-28 | End: 2024-10-28

## 2024-10-28 RX ORDER — ROCURONIUM BROMIDE 10 MG/ML
INJECTION, SOLUTION INTRAVENOUS AS NEEDED
Status: DISCONTINUED | OUTPATIENT
Start: 2024-10-28 | End: 2024-10-28

## 2024-10-28 RX ORDER — PHENYLEPHRINE HCL IN 0.9% NACL 1 MG/10 ML
SYRINGE (ML) INTRAVENOUS AS NEEDED
Status: DISCONTINUED | OUTPATIENT
Start: 2024-10-28 | End: 2024-10-28

## 2024-10-28 RX ORDER — DIPHENHYDRAMINE HYDROCHLORIDE 50 MG/ML
12.5 INJECTION INTRAMUSCULAR; INTRAVENOUS EVERY 6 HOURS PRN
Status: DISCONTINUED | OUTPATIENT
Start: 2024-10-28 | End: 2024-10-29 | Stop reason: HOSPADM

## 2024-10-28 RX ORDER — KETOROLAC TROMETHAMINE 30 MG/ML
INJECTION, SOLUTION INTRAMUSCULAR; INTRAVENOUS AS NEEDED
Status: DISCONTINUED | OUTPATIENT
Start: 2024-10-28 | End: 2024-10-28

## 2024-10-28 RX ORDER — OXYCODONE HYDROCHLORIDE 5 MG/1
5 TABLET ORAL EVERY 4 HOURS PRN
Status: DISCONTINUED | OUTPATIENT
Start: 2024-10-28 | End: 2024-10-29 | Stop reason: HOSPADM

## 2024-10-28 RX ORDER — ACETAMINOPHEN 325 MG/1
650 TABLET ORAL EVERY 6 HOURS
Status: DISCONTINUED | OUTPATIENT
Start: 2024-10-28 | End: 2024-10-29 | Stop reason: HOSPADM

## 2024-10-28 RX ORDER — METHOCARBAMOL 100 MG/ML
INJECTION, SOLUTION INTRAMUSCULAR; INTRAVENOUS AS NEEDED
Status: DISCONTINUED | OUTPATIENT
Start: 2024-10-28 | End: 2024-10-28

## 2024-10-28 RX ORDER — HYDROMORPHONE HYDROCHLORIDE 1 MG/ML
INJECTION, SOLUTION INTRAMUSCULAR; INTRAVENOUS; SUBCUTANEOUS AS NEEDED
Status: DISCONTINUED | OUTPATIENT
Start: 2024-10-28 | End: 2024-10-28

## 2024-10-28 SDOH — SOCIAL STABILITY: SOCIAL INSECURITY: DO YOU FEEL UNSAFE GOING BACK TO THE PLACE WHERE YOU ARE LIVING?: NO

## 2024-10-28 SDOH — SOCIAL STABILITY: SOCIAL INSECURITY: ABUSE: ADULT

## 2024-10-28 SDOH — SOCIAL STABILITY: SOCIAL INSECURITY: ARE THERE ANY APPARENT SIGNS OF INJURIES/BEHAVIORS THAT COULD BE RELATED TO ABUSE/NEGLECT?: NO

## 2024-10-28 SDOH — SOCIAL STABILITY: SOCIAL INSECURITY: WERE YOU ABLE TO COMPLETE ALL THE BEHAVIORAL HEALTH SCREENINGS?: YES

## 2024-10-28 SDOH — SOCIAL STABILITY: SOCIAL INSECURITY: ARE YOU OR HAVE YOU BEEN THREATENED OR ABUSED PHYSICALLY, EMOTIONALLY, OR SEXUALLY BY ANYONE?: NO

## 2024-10-28 SDOH — SOCIAL STABILITY: SOCIAL INSECURITY: HAVE YOU HAD ANY THOUGHTS OF HARMING ANYONE ELSE?: NO

## 2024-10-28 SDOH — SOCIAL STABILITY: SOCIAL INSECURITY: DOES ANYONE TRY TO KEEP YOU FROM HAVING/CONTACTING OTHER FRIENDS OR DOING THINGS OUTSIDE YOUR HOME?: NO

## 2024-10-28 SDOH — SOCIAL STABILITY: SOCIAL INSECURITY: HAVE YOU HAD THOUGHTS OF HARMING ANYONE ELSE?: NO

## 2024-10-28 SDOH — SOCIAL STABILITY: SOCIAL INSECURITY: DO YOU FEEL ANYONE HAS EXPLOITED OR TAKEN ADVANTAGE OF YOU FINANCIALLY OR OF YOUR PERSONAL PROPERTY?: NO

## 2024-10-28 SDOH — SOCIAL STABILITY: SOCIAL INSECURITY: HAS ANYONE EVER THREATENED TO HURT YOUR FAMILY OR YOUR PETS?: NO

## 2024-10-28 ASSESSMENT — COGNITIVE AND FUNCTIONAL STATUS - GENERAL
EATING MEALS: A LITTLE
MOBILITY SCORE: 8
MOVING TO AND FROM BED TO CHAIR: A LITTLE
DRESSING REGULAR UPPER BODY CLOTHING: A LITTLE
WALKING IN HOSPITAL ROOM: A LITTLE
DRESSING REGULAR LOWER BODY CLOTHING: A LITTLE
HELP NEEDED FOR BATHING: A LITTLE
DAILY ACTIVITIY SCORE: 18
MOVING FROM LYING ON BACK TO SITTING ON SIDE OF FLAT BED WITH BEDRAILS: A LITTLE
MOVING TO AND FROM BED TO CHAIR: A LITTLE
DAILY ACTIVITIY SCORE: 18
CLIMB 3 TO 5 STEPS WITH RAILING: TOTAL
TURNING FROM BACK TO SIDE WHILE IN FLAT BAD: A LITTLE
TURNING FROM BACK TO SIDE WHILE IN FLAT BAD: A LOT
TOILETING: A LITTLE
PERSONAL GROOMING: A LITTLE
MOVING TO AND FROM BED TO CHAIR: TOTAL
DRESSING REGULAR UPPER BODY CLOTHING: A LITTLE
MOVING FROM LYING ON BACK TO SITTING ON SIDE OF FLAT BED WITH BEDRAILS: A LOT
STANDING UP FROM CHAIR USING ARMS: A LITTLE
MOVING FROM LYING ON BACK TO SITTING ON SIDE OF FLAT BED WITH BEDRAILS: A LITTLE
EATING MEALS: A LITTLE
MOVING FROM LYING ON BACK TO SITTING ON SIDE OF FLAT BED WITH BEDRAILS: A LITTLE
MOBILITY SCORE: 18
WALKING IN HOSPITAL ROOM: A LITTLE
STANDING UP FROM CHAIR USING ARMS: TOTAL
EATING MEALS: A LITTLE
DRESSING REGULAR LOWER BODY CLOTHING: A LITTLE
TURNING FROM BACK TO SIDE WHILE IN FLAT BAD: A LITTLE
PERSONAL GROOMING: A LITTLE
MOBILITY SCORE: 18
DRESSING REGULAR UPPER BODY CLOTHING: A LITTLE
STANDING UP FROM CHAIR USING ARMS: A LITTLE
MOBILITY SCORE: 18
CLIMB 3 TO 5 STEPS WITH RAILING: A LITTLE
CLIMB 3 TO 5 STEPS WITH RAILING: A LITTLE
HELP NEEDED FOR BATHING: A LITTLE
WALKING IN HOSPITAL ROOM: TOTAL
MOVING TO AND FROM BED TO CHAIR: A LITTLE
PATIENT BASELINE BEDBOUND: NO
STANDING UP FROM CHAIR USING ARMS: A LITTLE
WALKING IN HOSPITAL ROOM: A LITTLE
DAILY ACTIVITIY SCORE: 18
TOILETING: A LITTLE
TOILETING: A LITTLE
CLIMB 3 TO 5 STEPS WITH RAILING: A LITTLE
HELP NEEDED FOR BATHING: A LITTLE
TURNING FROM BACK TO SIDE WHILE IN FLAT BAD: A LITTLE
DRESSING REGULAR LOWER BODY CLOTHING: A LITTLE
PERSONAL GROOMING: A LITTLE

## 2024-10-28 ASSESSMENT — PAIN - FUNCTIONAL ASSESSMENT
PAIN_FUNCTIONAL_ASSESSMENT: 0-10
PAIN_FUNCTIONAL_ASSESSMENT: VAS (VISUAL ANALOG SCALE)
PAIN_FUNCTIONAL_ASSESSMENT: VAS (VISUAL ANALOG SCALE)
PAIN_FUNCTIONAL_ASSESSMENT: 0-10
PAIN_FUNCTIONAL_ASSESSMENT: 0-10
PAIN_FUNCTIONAL_ASSESSMENT: VAS (VISUAL ANALOG SCALE)
PAIN_FUNCTIONAL_ASSESSMENT: 0-10

## 2024-10-28 ASSESSMENT — PAIN SCALES - GENERAL
PAINLEVEL_OUTOF10: 5 - MODERATE PAIN
PAINLEVEL_OUTOF10: 2
PAINLEVEL_OUTOF10: 3
PAINLEVEL_OUTOF10: 7
PAINLEVEL_OUTOF10: 5 - MODERATE PAIN
PAINLEVEL_OUTOF10: 5 - MODERATE PAIN
PAINLEVEL_OUTOF10: 7
PAINLEVEL_OUTOF10: 3
PAINLEVEL_OUTOF10: 3
PAINLEVEL_OUTOF10: 0 - NO PAIN
PAINLEVEL_OUTOF10: 5 - MODERATE PAIN
PAINLEVEL_OUTOF10: 0 - NO PAIN
PAINLEVEL_OUTOF10: 8
PAINLEVEL_OUTOF10: 3
PAINLEVEL_OUTOF10: 3
PAINLEVEL_OUTOF10: 0 - NO PAIN
PAINLEVEL_OUTOF10: 0 - NO PAIN
PAINLEVEL_OUTOF10: 7
PAINLEVEL_OUTOF10: 9
PAINLEVEL_OUTOF10: 2
PAINLEVEL_OUTOF10: 2
PAINLEVEL_OUTOF10: 5 - MODERATE PAIN
PAINLEVEL_OUTOF10: 5 - MODERATE PAIN
PAINLEVEL_OUTOF10: 7
PAINLEVEL_OUTOF10: 7

## 2024-10-28 ASSESSMENT — LIFESTYLE VARIABLES
HOW MANY STANDARD DRINKS CONTAINING ALCOHOL DO YOU HAVE ON A TYPICAL DAY: PATIENT DOES NOT DRINK
HOW OFTEN DO YOU HAVE 6 OR MORE DRINKS ON ONE OCCASION: NEVER
HOW OFTEN DO YOU HAVE A DRINK CONTAINING ALCOHOL: NEVER
SKIP TO QUESTIONS 9-10: 1
AUDIT-C TOTAL SCORE: 0
AUDIT-C TOTAL SCORE: 0

## 2024-10-28 ASSESSMENT — PAIN DESCRIPTION - LOCATION: LOCATION: BACK

## 2024-10-28 ASSESSMENT — ACTIVITIES OF DAILY LIVING (ADL)
TOILETING: INDEPENDENT
HEARING - RIGHT EAR: FUNCTIONAL
DRESSING YOURSELF: INDEPENDENT
FEEDING YOURSELF: INDEPENDENT
ADEQUATE_TO_COMPLETE_ADL: YES
PATIENT'S MEMORY ADEQUATE TO SAFELY COMPLETE DAILY ACTIVITIES?: YES
BATHING: INDEPENDENT
HEARING - LEFT EAR: FUNCTIONAL
GROOMING: INDEPENDENT
WALKS IN HOME: INDEPENDENT
JUDGMENT_ADEQUATE_SAFELY_COMPLETE_DAILY_ACTIVITIES: YES
ADL_ASSISTANCE: INDEPENDENT

## 2024-10-28 ASSESSMENT — PATIENT HEALTH QUESTIONNAIRE - PHQ9
SUM OF ALL RESPONSES TO PHQ9 QUESTIONS 1 & 2: 0
2. FEELING DOWN, DEPRESSED OR HOPELESS: NOT AT ALL
1. LITTLE INTEREST OR PLEASURE IN DOING THINGS: NOT AT ALL

## 2024-10-28 ASSESSMENT — PAIN DESCRIPTION - ORIENTATION: ORIENTATION: MID

## 2024-10-28 ASSESSMENT — COLUMBIA-SUICIDE SEVERITY RATING SCALE - C-SSRS
6. HAVE YOU EVER DONE ANYTHING, STARTED TO DO ANYTHING, OR PREPARED TO DO ANYTHING TO END YOUR LIFE?: NO
1. IN THE PAST MONTH, HAVE YOU WISHED YOU WERE DEAD OR WISHED YOU COULD GO TO SLEEP AND NOT WAKE UP?: NO
2. HAVE YOU ACTUALLY HAD ANY THOUGHTS OF KILLING YOURSELF?: NO

## 2024-10-28 ASSESSMENT — PAIN DESCRIPTION - DESCRIPTORS: DESCRIPTORS: STABBING

## 2024-10-29 VITALS
HEART RATE: 81 BPM | OXYGEN SATURATION: 95 % | WEIGHT: 182.98 LBS | TEMPERATURE: 97.9 F | SYSTOLIC BLOOD PRESSURE: 94 MMHG | BODY MASS INDEX: 34.55 KG/M2 | RESPIRATION RATE: 16 BRPM | HEIGHT: 61 IN | DIASTOLIC BLOOD PRESSURE: 57 MMHG

## 2024-10-29 LAB
ANION GAP SERPL CALC-SCNC: 15 MMOL/L (ref 10–20)
BUN SERPL-MCNC: 25 MG/DL (ref 6–23)
CALCIUM SERPL-MCNC: 9 MG/DL (ref 8.6–10.3)
CHLORIDE SERPL-SCNC: 102 MMOL/L (ref 98–107)
CO2 SERPL-SCNC: 23 MMOL/L (ref 21–32)
CREAT SERPL-MCNC: 0.95 MG/DL (ref 0.5–1.05)
EGFRCR SERPLBLD CKD-EPI 2021: 67 ML/MIN/1.73M*2
ERYTHROCYTE [DISTWIDTH] IN BLOOD BY AUTOMATED COUNT: 13.2 % (ref 11.5–14.5)
GLUCOSE SERPL-MCNC: 159 MG/DL (ref 74–99)
HCT VFR BLD AUTO: 35.4 % (ref 36–46)
HGB BLD-MCNC: 11.7 G/DL (ref 12–16)
MCH RBC QN AUTO: 27.9 PG (ref 26–34)
MCHC RBC AUTO-ENTMCNC: 33.1 G/DL (ref 32–36)
MCV RBC AUTO: 85 FL (ref 80–100)
NRBC BLD-RTO: 0 /100 WBCS (ref 0–0)
PLATELET # BLD AUTO: 192 X10*3/UL (ref 150–450)
POTASSIUM SERPL-SCNC: 4.1 MMOL/L (ref 3.5–5.3)
RBC # BLD AUTO: 4.19 X10*6/UL (ref 4–5.2)
SODIUM SERPL-SCNC: 136 MMOL/L (ref 136–145)
WBC # BLD AUTO: 15.5 X10*3/UL (ref 4.4–11.3)

## 2024-10-29 PROCEDURE — 36415 COLL VENOUS BLD VENIPUNCTURE: CPT | Performed by: PHYSICIAN ASSISTANT

## 2024-10-29 PROCEDURE — 97165 OT EVAL LOW COMPLEX 30 MIN: CPT | Mod: GO

## 2024-10-29 PROCEDURE — 97535 SELF CARE MNGMENT TRAINING: CPT | Mod: GO

## 2024-10-29 PROCEDURE — 97116 GAIT TRAINING THERAPY: CPT | Mod: GP

## 2024-10-29 PROCEDURE — 80048 BASIC METABOLIC PNL TOTAL CA: CPT | Performed by: PHYSICIAN ASSISTANT

## 2024-10-29 PROCEDURE — 7100000011 HC EXTENDED STAY RECOVERY HOURLY - NURSING UNIT

## 2024-10-29 PROCEDURE — 9420000001 HC RT PATIENT EDUCATION 5 MIN

## 2024-10-29 PROCEDURE — 97112 NEUROMUSCULAR REEDUCATION: CPT | Mod: GP

## 2024-10-29 PROCEDURE — 99024 POSTOP FOLLOW-UP VISIT: CPT

## 2024-10-29 PROCEDURE — 2500000004 HC RX 250 GENERAL PHARMACY W/ HCPCS (ALT 636 FOR OP/ED): Mod: JZ | Performed by: PHYSICIAN ASSISTANT

## 2024-10-29 PROCEDURE — 85027 COMPLETE CBC AUTOMATED: CPT | Performed by: PHYSICIAN ASSISTANT

## 2024-10-29 PROCEDURE — 2500000001 HC RX 250 WO HCPCS SELF ADMINISTERED DRUGS (ALT 637 FOR MEDICARE OP): Performed by: PHYSICIAN ASSISTANT

## 2024-10-29 ASSESSMENT — PAIN DESCRIPTION - DESCRIPTORS
DESCRIPTORS: BURNING
DESCRIPTORS: BURNING

## 2024-10-29 ASSESSMENT — PAIN SCALES - GENERAL
PAINLEVEL_OUTOF10: 3
PAINLEVEL_OUTOF10: 4

## 2024-10-29 ASSESSMENT — ACTIVITIES OF DAILY LIVING (ADL)
HOME_MANAGEMENT_TIME_ENTRY: 14
LACK_OF_TRANSPORTATION: NO
ADL_ASSISTANCE: INDEPENDENT

## 2024-10-29 ASSESSMENT — COGNITIVE AND FUNCTIONAL STATUS - GENERAL
WALKING IN HOSPITAL ROOM: A LITTLE
MOBILITY SCORE: 18
DRESSING REGULAR LOWER BODY CLOTHING: A LITTLE
MOVING FROM LYING ON BACK TO SITTING ON SIDE OF FLAT BED WITH BEDRAILS: A LITTLE
HELP NEEDED FOR BATHING: A LITTLE
DAILY ACTIVITIY SCORE: 21
STANDING UP FROM CHAIR USING ARMS: A LITTLE
CLIMB 3 TO 5 STEPS WITH RAILING: A LITTLE
TURNING FROM BACK TO SIDE WHILE IN FLAT BAD: A LITTLE
MOVING TO AND FROM BED TO CHAIR: A LITTLE
DRESSING REGULAR UPPER BODY CLOTHING: A LITTLE

## 2024-10-29 ASSESSMENT — PAIN - FUNCTIONAL ASSESSMENT
PAIN_FUNCTIONAL_ASSESSMENT: 0-10
PAIN_FUNCTIONAL_ASSESSMENT: 0-10

## 2024-11-02 ENCOUNTER — LAB (OUTPATIENT)
Dept: LAB | Facility: LAB | Age: 64
End: 2024-11-02
Payer: COMMERCIAL

## 2024-11-02 DIAGNOSIS — I10 ESSENTIAL HYPERTENSION, BENIGN: ICD-10-CM

## 2024-11-02 DIAGNOSIS — Z13.6 SCREENING FOR CARDIOVASCULAR CONDITION: ICD-10-CM

## 2024-11-02 LAB
ALBUMIN SERPL BCP-MCNC: 4.1 G/DL (ref 3.4–5)
ALP SERPL-CCNC: 46 U/L (ref 33–136)
ALT SERPL W P-5'-P-CCNC: 15 U/L (ref 7–45)
ANION GAP SERPL CALC-SCNC: 15 MMOL/L (ref 10–20)
AST SERPL W P-5'-P-CCNC: 16 U/L (ref 9–39)
BASOPHILS # BLD AUTO: 0.02 X10*3/UL (ref 0–0.1)
BASOPHILS NFR BLD AUTO: 0.2 %
BILIRUB SERPL-MCNC: 0.8 MG/DL (ref 0–1.2)
BUN SERPL-MCNC: 18 MG/DL (ref 6–23)
CALCIUM SERPL-MCNC: 9.3 MG/DL (ref 8.6–10.3)
CHLORIDE SERPL-SCNC: 102 MMOL/L (ref 98–107)
CHOLEST SERPL-MCNC: 196 MG/DL (ref 0–199)
CHOLESTEROL/HDL RATIO: 4.7
CO2 SERPL-SCNC: 29 MMOL/L (ref 21–32)
CREAT SERPL-MCNC: 0.92 MG/DL (ref 0.5–1.05)
EGFRCR SERPLBLD CKD-EPI 2021: 70 ML/MIN/1.73M*2
EOSINOPHIL # BLD AUTO: 0.2 X10*3/UL (ref 0–0.7)
EOSINOPHIL NFR BLD AUTO: 1.8 %
ERYTHROCYTE [DISTWIDTH] IN BLOOD BY AUTOMATED COUNT: 13 % (ref 11.5–14.5)
GLUCOSE SERPL-MCNC: 97 MG/DL (ref 74–99)
HCT VFR BLD AUTO: 41.4 % (ref 36–46)
HDLC SERPL-MCNC: 41.3 MG/DL
HGB BLD-MCNC: 13.3 G/DL (ref 12–16)
IMM GRANULOCYTES # BLD AUTO: 0.08 X10*3/UL (ref 0–0.7)
IMM GRANULOCYTES NFR BLD AUTO: 0.7 % (ref 0–0.9)
LDLC SERPL CALC-MCNC: 90 MG/DL
LYMPHOCYTES # BLD AUTO: 3.35 X10*3/UL (ref 1.2–4.8)
LYMPHOCYTES NFR BLD AUTO: 30.8 %
MCH RBC QN AUTO: 27.1 PG (ref 26–34)
MCHC RBC AUTO-ENTMCNC: 32.1 G/DL (ref 32–36)
MCV RBC AUTO: 84 FL (ref 80–100)
MONOCYTES # BLD AUTO: 0.99 X10*3/UL (ref 0.1–1)
MONOCYTES NFR BLD AUTO: 9.1 %
NEUTROPHILS # BLD AUTO: 6.23 X10*3/UL (ref 1.2–7.7)
NEUTROPHILS NFR BLD AUTO: 57.4 %
NON HDL CHOLESTEROL: 155 MG/DL (ref 0–149)
NRBC BLD-RTO: 0 /100 WBCS (ref 0–0)
PLATELET # BLD AUTO: 213 X10*3/UL (ref 150–450)
POTASSIUM SERPL-SCNC: 4.8 MMOL/L (ref 3.5–5.3)
PROT SERPL-MCNC: 6.6 G/DL (ref 6.4–8.2)
RBC # BLD AUTO: 4.91 X10*6/UL (ref 4–5.2)
SODIUM SERPL-SCNC: 141 MMOL/L (ref 136–145)
TRIGL SERPL-MCNC: 325 MG/DL (ref 0–149)
VLDL: 65 MG/DL (ref 0–40)
WBC # BLD AUTO: 10.9 X10*3/UL (ref 4.4–11.3)

## 2024-11-02 PROCEDURE — 36415 COLL VENOUS BLD VENIPUNCTURE: CPT

## 2024-11-02 PROCEDURE — 80061 LIPID PANEL: CPT

## 2024-11-02 PROCEDURE — 80053 COMPREHEN METABOLIC PANEL: CPT

## 2024-11-02 PROCEDURE — 85025 COMPLETE CBC W/AUTO DIFF WBC: CPT

## 2024-11-04 ENCOUNTER — APPOINTMENT (OUTPATIENT)
Dept: PRIMARY CARE | Facility: CLINIC | Age: 64
End: 2024-11-04
Payer: COMMERCIAL

## 2024-11-04 VITALS
WEIGHT: 181.2 LBS | DIASTOLIC BLOOD PRESSURE: 66 MMHG | HEART RATE: 66 BPM | SYSTOLIC BLOOD PRESSURE: 130 MMHG | BODY MASS INDEX: 34.24 KG/M2 | TEMPERATURE: 96.1 F | OXYGEN SATURATION: 99 %

## 2024-11-04 DIAGNOSIS — Z23 NEED FOR INFLUENZA VACCINATION: ICD-10-CM

## 2024-11-04 DIAGNOSIS — M19.039 LOCALIZED PRIMARY OSTEOARTHRITIS OF WRIST, UNSPECIFIED LATERALITY: ICD-10-CM

## 2024-11-04 DIAGNOSIS — Z12.31 SCREENING MAMMOGRAM FOR BREAST CANCER: ICD-10-CM

## 2024-11-04 DIAGNOSIS — M54.16 LUMBAR RADICULOPATHY: ICD-10-CM

## 2024-11-04 DIAGNOSIS — I10 ESSENTIAL HYPERTENSION, BENIGN: Primary | ICD-10-CM

## 2024-11-04 PROCEDURE — 3078F DIAST BP <80 MM HG: CPT | Performed by: INTERNAL MEDICINE

## 2024-11-04 PROCEDURE — 99214 OFFICE O/P EST MOD 30 MIN: CPT | Performed by: INTERNAL MEDICINE

## 2024-11-04 PROCEDURE — 90471 IMMUNIZATION ADMIN: CPT | Performed by: INTERNAL MEDICINE

## 2024-11-04 PROCEDURE — 3075F SYST BP GE 130 - 139MM HG: CPT | Performed by: INTERNAL MEDICINE

## 2024-11-04 PROCEDURE — G2211 COMPLEX E/M VISIT ADD ON: HCPCS | Performed by: INTERNAL MEDICINE

## 2024-11-04 PROCEDURE — 90656 IIV3 VACC NO PRSV 0.5 ML IM: CPT | Performed by: INTERNAL MEDICINE

## 2024-11-04 RX ORDER — MELOXICAM 15 MG/1
15 TABLET ORAL DAILY
Qty: 90 TABLET | Refills: 1 | Status: SHIPPED | OUTPATIENT
Start: 2024-11-04

## 2024-11-04 RX ORDER — RAMIPRIL 10 MG/1
10 CAPSULE ORAL DAILY
Qty: 90 CAPSULE | Refills: 1 | Status: SHIPPED | OUTPATIENT
Start: 2024-11-04

## 2024-11-04 ASSESSMENT — ENCOUNTER SYMPTOMS
MUSCULOSKELETAL NEGATIVE: 1
PSYCHIATRIC NEGATIVE: 1
NEUROLOGICAL NEGATIVE: 1
GASTROINTESTINAL NEGATIVE: 1
ALLERGIC/IMMUNOLOGIC NEGATIVE: 1
EYES NEGATIVE: 1
CARDIOVASCULAR NEGATIVE: 1
HEMATOLOGIC/LYMPHATIC NEGATIVE: 1
CONSTITUTIONAL NEGATIVE: 1
ENDOCRINE NEGATIVE: 1
RESPIRATORY NEGATIVE: 1

## 2024-11-04 NOTE — PROGRESS NOTES
Subjective   Patient ID: Janet Santillan is a 64 y.o. female who presents for 6 month follow up.  Patient presents in follow up regarding hypertension.  Blood pressure has been well controlled on current therapy.  No adverse effects of medication reported.  Patient denies chest pain, palpitations, shortness of breath, orthopnea, fatigue or edema.         Review of Systems   Constitutional: Negative.    HENT: Negative.     Eyes: Negative.    Respiratory: Negative.     Cardiovascular: Negative.    Gastrointestinal: Negative.    Endocrine: Negative.    Genitourinary: Negative.    Musculoskeletal: Negative.    Skin: Negative.    Allergic/Immunologic: Negative.    Neurological: Negative.    Hematological: Negative.    Psychiatric/Behavioral: Negative.         Objective     Blood pressure 130/66, pulse 66, temperature 35.6 °C (96.1 °F), temperature source Temporal, weight 82.2 kg (181 lb 3.2 oz), SpO2 99%.   Physical Exam  Constitutional:       Appearance: Normal appearance.   Neck:      Vascular: No carotid bruit.   Cardiovascular:      Rate and Rhythm: Normal rate and regular rhythm.      Pulses: Normal pulses.      Heart sounds: Normal heart sounds. No murmur heard.  Pulmonary:      Effort: Pulmonary effort is normal.      Breath sounds: Normal breath sounds. No wheezing or rales.   Abdominal:      General: Abdomen is flat. There is no distension.      Palpations: Abdomen is soft.      Tenderness: There is no abdominal tenderness.   Musculoskeletal:         General: Normal range of motion.      Cervical back: Normal range of motion and neck supple.   Skin:     General: Skin is warm and dry.   Neurological:      General: No focal deficit present.      Mental Status: She is alert and oriented to person, place, and time.   Psychiatric:         Mood and Affect: Mood normal.         Behavior: Behavior normal.         Assessment/Plan   Problem List Items Addressed This Visit       Essential hypertension, benign - Primary     Relevant Medications    ramipril (Altace) 10 mg capsule    Localized primary osteoarthritis of wrist    Relevant Medications    meloxicam (Mobic) 15 mg tablet    Lumbar radiculopathy    Relevant Medications    meloxicam (Mobic) 15 mg tablet     Other Visit Diagnoses       Screening mammogram for breast cancer        Relevant Orders    BI mammo bilateral screening tomosynthesis    Need for influenza vaccination        Relevant Orders    Flu vaccine, trivalent, preservative free, age 6 months and greater (Fluarix/Fluzone/Flulaval)          BP well controlled on current therapy.  Will continue present therapy and follow.  Arthritis pain well compensated on current therapy.  Will continue present therapy and follow.  It is noted that she underwent successful right carpal tunnel release on 1/24 and left carpal tunnel release on 2/14 per Dr. Baer and is doing well.  Patient underwent spine surgery per Dr. Elaine last week on 10/28 and is doing very well.  She is encouraged and advised to continue improving dietary choices and portion control as well as increasing exercise to promote weight loss.   Annual mammogram is due this month and order is entered.  Annual screening lab is reviewed with all parameters at goal other than marginally elevated trig level.  Will continue to follow annually.      Follow up in 6 months

## 2024-11-12 ENCOUNTER — HOSPITAL ENCOUNTER (OUTPATIENT)
Dept: RADIOLOGY | Facility: CLINIC | Age: 64
Discharge: HOME | End: 2024-11-12
Payer: COMMERCIAL

## 2024-11-12 ENCOUNTER — OFFICE VISIT (OUTPATIENT)
Dept: ORTHOPEDIC SURGERY | Facility: CLINIC | Age: 64
End: 2024-11-12
Payer: COMMERCIAL

## 2024-11-12 VITALS — HEIGHT: 61 IN | WEIGHT: 181 LBS | BODY MASS INDEX: 34.17 KG/M2

## 2024-11-12 DIAGNOSIS — M48.062 LUMBAR STENOSIS WITH NEUROGENIC CLAUDICATION: Primary | ICD-10-CM

## 2024-11-12 DIAGNOSIS — M48.062 LUMBAR STENOSIS WITH NEUROGENIC CLAUDICATION: ICD-10-CM

## 2024-11-12 DIAGNOSIS — Z98.1 STATUS POST LUMBAR SPINAL FUSION: ICD-10-CM

## 2024-11-12 DIAGNOSIS — T81.31XA POSTOPERATIVE WOUND DEHISCENCE, INITIAL ENCOUNTER: ICD-10-CM

## 2024-11-12 PROCEDURE — 99211 OFF/OP EST MAY X REQ PHY/QHP: CPT | Performed by: PHYSICIAN ASSISTANT

## 2024-11-12 PROCEDURE — 3008F BODY MASS INDEX DOCD: CPT | Performed by: PHYSICIAN ASSISTANT

## 2024-11-12 PROCEDURE — 72100 X-RAY EXAM L-S SPINE 2/3 VWS: CPT

## 2024-11-12 PROCEDURE — 72100 X-RAY EXAM L-S SPINE 2/3 VWS: CPT | Performed by: RADIOLOGY

## 2024-11-12 RX ORDER — CLINDAMYCIN HYDROCHLORIDE 300 MG/1
300 CAPSULE ORAL 3 TIMES DAILY
Qty: 21 CAPSULE | Refills: 0 | Status: SHIPPED | OUTPATIENT
Start: 2024-11-12 | End: 2024-11-19

## 2024-11-12 ASSESSMENT — PAIN - FUNCTIONAL ASSESSMENT: PAIN_FUNCTIONAL_ASSESSMENT: 0-10

## 2024-11-12 NOTE — LETTER
November 12, 2024     Patient: Janet Santillan   YOB: 1960   Date of Visit: 11/12/2024       To Whom It May Concern:    It is my medical opinion that Janet Santillan may return to work on 01/25/27 .    If you have any questions or concerns, please don't hesitate to call.         Sincerely,    Tawnya Nunez PA-C  Department of Orthopaedic Surgery, Spine    [address] [place] [address]    Voicemail: [phone]   Appts: [phone]  Fax: [phone]

## 2024-11-12 NOTE — PROGRESS NOTES
HPI:  Janet Santillan is a 64 y.o. female who presents today for initial postop visit after undergoing L4-S1 Lumbar Spine Decompression Revision; Transforaminal Lumbar Interbody Fusion; Instrumentation  on 10/28/24 with Dr. Elaine.    Overall, patient is doing great. She already has noted significant improvement in her pain. She notes posterior thigh pain primarily at night. Denies additional lower extremity pain/numbness/tingling. No focal motor weakness. She is ambulating well without assistive devices. She is no longer requiring pain medicaiton.    ROS:  Reviewed on EMR and patient intake sheet.    PMH/SH:  Reviewed on EMR and patient intake sheet.    Exam:  Well appearing, NAD  Pleasant & cooperative  Decreased ROM lumbar spine with rotation, flexion/extension  no paraspinal muscle spasms  lower extremity sensation intact to light touch  lower extremity motor 5/5 throughout  normal gait & station    lumbar wound healing well without signs of infection. Small wound dehiscence lower right incision, otherwise well approximated without redness, palpable fluid collection or drainage.   Sutures removed without complication.    Radiology:     Xrays done in the office today 11/12/24 personally reviewed. Hardware intact and in good position. No fractures or dislocations.     Assessment and Plan:  1. Lumbar stenosis with neurogenic claudication (Primary)  - XR lumbar spine 2-3 views; Future    2. Status post lumbar spinal fusion  - XR lumbar spine 2-3 views; Future    3. Postoperative wound dehiscence, initial encounter  - clindamycin (Cleocin HCL) 300 mg capsule; Take 1 capsule (300 mg) by mouth 3 times a day for 7 days.  Dispense: 21 capsule; Refill: 0    Both the patient and I are very pleased with her recovery so far. We reviewed Xrays in detail today.    We reviewed wound care in detail today. Okay to leave incision open to air. Okay to shower letting warm, soapy water run down the wound, do not scrub, pat dry. Okay  to apply small amount of hydrogen peroxide nightly to scabs if needed.   For the wound dehiscence I did provide a short course of antibiotics for prophylaxis to prevent any wound infection.     Okay to lift up to 25 pounds until 8 weeks postop, then may increase lifting as tolerated. Continue to limit excessive bending/lifting/twisting at the waist, however okay to do gentle ROM exercises to help reduce stiffness & increase mobility. No strenuous activity such as running/jumping/heavy lifting/activities that strain low back until 8 weeks postop. Encourage ambulating as tolerated. If needed okay to start outpatient physical therapy at 6-8 weeks postop.    Pain medication refilled today per patient request. [place]ARRS reviewed. Discussed postop pain medication today and recommend transitioning from opioids to plain tylenol. No NSAIDs for lumbar until 8 weeks postop due to increased risk of pseudoarthrosis.     Plan to see patient for followup with repeat Xrays 1 year postop, or sooner if needed. All questions answered. Patient in agreement to plan of care. Of course Janet Santillan can call at anytime with questions or concerns.       I have personally reviewed the OARRS report for the patient, no issues identified. This report is scanned into the electronic medical record. I have considered the risks of abuse, dependence, addiction and diversion. The 7 day Rx sent to pharmacy on file.    Tawnya Nunez PA-C  Department of Orthopaedic Surgery    [address] [place] [address]    Voicemail: [phone]   Appts: [phone]  Fax: [phone]

## 2024-11-12 NOTE — LETTER
November 12, 2024     Patient: Janet Santillan   YOB: 1960   Date of Visit: 11/12/2024       To Whom It May Concern:    It is my medical opinion that Janet Santillan may return to work on 01/27/25 .    If you have any questions or concerns, please don't hesitate to call.         Sincerely,    Tawnya Nunez PA-C  Department of Orthopaedic Surgery, Spine    [address] [place] [address]    Voicemail: [phone]   Appts: [phone]  Fax: [phone]

## 2024-11-17 ENCOUNTER — OFFICE VISIT (OUTPATIENT)
Dept: URGENT CARE | Age: 64
End: 2024-11-17
Payer: COMMERCIAL

## 2024-11-17 VITALS
HEART RATE: 82 BPM | DIASTOLIC BLOOD PRESSURE: 76 MMHG | SYSTOLIC BLOOD PRESSURE: 154 MMHG | OXYGEN SATURATION: 97 % | RESPIRATION RATE: 16 BRPM | TEMPERATURE: 98.1 F

## 2024-11-17 DIAGNOSIS — R05.1 ACUTE COUGH: ICD-10-CM

## 2024-11-17 DIAGNOSIS — J01.10 ACUTE FRONTAL SINUSITIS, RECURRENCE NOT SPECIFIED: Primary | ICD-10-CM

## 2024-11-17 PROCEDURE — 3077F SYST BP >= 140 MM HG: CPT | Performed by: PHYSICIAN ASSISTANT

## 2024-11-17 PROCEDURE — 3078F DIAST BP <80 MM HG: CPT | Performed by: PHYSICIAN ASSISTANT

## 2024-11-17 PROCEDURE — 99203 OFFICE O/P NEW LOW 30 MIN: CPT | Performed by: PHYSICIAN ASSISTANT

## 2024-11-17 RX ORDER — DOXYCYCLINE 100 MG/1
100 TABLET ORAL 2 TIMES DAILY
Qty: 20 TABLET | Refills: 0 | Status: SHIPPED | OUTPATIENT
Start: 2024-11-17 | End: 2024-11-27

## 2024-11-17 RX ORDER — BENZONATATE 100 MG/1
100 CAPSULE ORAL 3 TIMES DAILY PRN
Qty: 30 CAPSULE | Refills: 0 | Status: SHIPPED | OUTPATIENT
Start: 2024-11-17

## 2024-11-17 ASSESSMENT — ENCOUNTER SYMPTOMS
FACIAL SWELLING: 0
GASTROINTESTINAL NEGATIVE: 1
RHINORRHEA: 1
CARDIOVASCULAR NEGATIVE: 1
SINUS PRESSURE: 1
SHORTNESS OF BREATH: 0
CONSTITUTIONAL NEGATIVE: 1
COUGH: 1

## 2024-11-17 NOTE — PROGRESS NOTES
Subjective   Patient ID: Janet Santillan is a 64 y.o. female. They present today with a chief complaint of Cough (5 days. Concern bronchitis. Recent lumbar surgery. ).    History of Present Illness  64-year-old female with past medical history positive for arthritis and hypertension with recent lower back surgery has had 7-8-day history of cough and congestion patient just had back surgery 3 weeks ago    No fevers positive sinus pressure and pain no shortness of breath      History provided by:  Patient  Cough  Associated symptoms include rhinorrhea. Pertinent negatives include no shortness of breath.       Past Medical History  Allergies as of 11/17/2024 - Reviewed 11/17/2024   Allergen Reaction Noted    Bee venom protein (honey bee) Anaphylaxis and Shortness of breath 05/01/2023    Cefuroxime Hives and Unknown 05/01/2023    Duricef [cefadroxil] Hives and Unknown 05/01/2023    Penicillins Hives and Unknown 05/01/2023    Sulfa (sulfonamide antibiotics) Hives and Unknown 05/01/2023       (Not in a hospital admission)       Past Medical History:   Diagnosis Date    Hypertension     Other conditions influencing health status     No significant past medical history    PONV (postoperative nausea and vomiting)     Truncal muscle weakness 06/04/2024    Weakness of right leg 06/04/2024       Past Surgical History:   Procedure Laterality Date    CARPAL TUNNEL RELEASE Bilateral     OTHER SURGICAL HISTORY  10/28/2019    Plantar fasciotomy    OTHER SURGICAL HISTORY  10/28/2019    Facial surgery    OTHER SURGICAL HISTORY  10/28/2019    Hysterectomy total with unilateral removal of ovary    OTHER SURGICAL HISTORY  10/28/2019    Tonsillectomy    OTHER SURGICAL HISTORY  02/04/2021    Back surgery        reports that she has never smoked. She has never used smokeless tobacco. She reports current alcohol use. She reports that she does not use drugs.    Review of Systems  Review of Systems   Constitutional: Negative.    HENT:  Positive  for rhinorrhea and sinus pressure. Negative for dental problem, drooling, ear discharge and facial swelling.    Respiratory:  Positive for cough. Negative for shortness of breath.    Cardiovascular: Negative.    Gastrointestinal: Negative.    Genitourinary: Negative.    All other systems reviewed and are negative.                                 Objective    Vitals:    11/17/24 1653   BP: 154/76   Pulse: 82   Resp: 16   Temp: 36.7 °C (98.1 °F)   SpO2: 97%     No LMP recorded. Patient has had a hysterectomy.    Physical Exam  Vitals and nursing note reviewed.   Constitutional:       Appearance: Normal appearance.   HENT:      Head: Normocephalic and atraumatic.      Right Ear: Tympanic membrane, ear canal and external ear normal.      Left Ear: Tympanic membrane, ear canal and external ear normal.      Nose: Congestion and rhinorrhea present.      Comments: Positive purulent nasal discharge positive frontal maxillary sinus pain with palp  Eyes:      Pupils: Pupils are equal, round, and reactive to light.   Cardiovascular:      Rate and Rhythm: Normal rate.   Pulmonary:      Effort: Pulmonary effort is normal.      Breath sounds: Normal breath sounds.   Abdominal:      General: Abdomen is flat. Bowel sounds are normal.      Palpations: Abdomen is soft.   Skin:     General: Skin is warm and dry.      Capillary Refill: Capillary refill takes less than 2 seconds.   Neurological:      General: No focal deficit present.      Mental Status: She is alert and oriented to person, place, and time.         Procedures    Point of Care Test & Imaging Results from this visit  No results found for this visit on 11/17/24.   No results found.    Diagnostic study results (if any) were reviewed by Sakina Beck PA-C.    Assessment/Plan   Allergies, medications, history, and pertinent labs/EKGs/Imaging reviewed by Sakina Beck PA-C.     Medical Decision Making  Differential:   1) sinusitis   2) bronchitis   3) viral URI    64-year-old  female with past medical history of recent back surgery has had cough and congestion started 7-8 days ago patient is concerned that it may injure her lower back again denies any shortness of breath but she is coughing so hard at times that she feels it in her lower back per patient  Positive sinus pressure and pain postnasal drip on exam we will treat her with oral antibiotic and Tessalon Perles stressed close follow-up with her primary doctor for recheck if not improved   Plan: Discussed differential with the patient and /or parents family   Patient to  follow up with the PCP in the next 2-3 days  Return for any worsening symptoms or go to the ER for further evaluation. Patient/family/caregiver  understands return   precautions and discharge instructions and is agreeable to the current plan   Impression: sinusitis         Orders and Diagnoses for this visit did she get her    Orders and Diagnoses  There are no diagnoses linked to this encounter.    Medical Admin Record      Patient disposition: Home    Electronically signed by Sakina Beck PA-C  5:36 PM

## 2024-12-11 ENCOUNTER — HOSPITAL ENCOUNTER (OUTPATIENT)
Dept: RADIOLOGY | Facility: HOSPITAL | Age: 64
Discharge: HOME | End: 2024-12-11
Payer: COMMERCIAL

## 2024-12-11 DIAGNOSIS — Z12.31 SCREENING MAMMOGRAM FOR BREAST CANCER: ICD-10-CM

## 2024-12-11 DIAGNOSIS — R92.8 ABNORMAL MAMMOGRAM OF RIGHT BREAST: Primary | ICD-10-CM

## 2024-12-11 PROCEDURE — 77067 SCR MAMMO BI INCL CAD: CPT | Performed by: RADIOLOGY

## 2024-12-11 PROCEDURE — 77063 BREAST TOMOSYNTHESIS BI: CPT

## 2024-12-11 PROCEDURE — 77063 BREAST TOMOSYNTHESIS BI: CPT | Performed by: RADIOLOGY

## 2024-12-15 ENCOUNTER — OFFICE VISIT (OUTPATIENT)
Dept: URGENT CARE | Age: 64
End: 2024-12-15
Payer: COMMERCIAL

## 2024-12-15 ENCOUNTER — ANCILLARY PROCEDURE (OUTPATIENT)
Dept: URGENT CARE | Age: 64
End: 2024-12-15
Payer: COMMERCIAL

## 2024-12-15 VITALS
DIASTOLIC BLOOD PRESSURE: 83 MMHG | TEMPERATURE: 98.6 F | OXYGEN SATURATION: 95 % | SYSTOLIC BLOOD PRESSURE: 163 MMHG | HEART RATE: 89 BPM

## 2024-12-15 DIAGNOSIS — H01.112 CONTACT DERMATITIS OF UPPER AND LOWER EYELIDS OF BOTH EYES: ICD-10-CM

## 2024-12-15 DIAGNOSIS — H01.111 CONTACT DERMATITIS OF UPPER AND LOWER EYELIDS OF BOTH EYES: ICD-10-CM

## 2024-12-15 DIAGNOSIS — I10 ESSENTIAL HYPERTENSION, BENIGN: ICD-10-CM

## 2024-12-15 DIAGNOSIS — R05.1 ACUTE COUGH: Primary | ICD-10-CM

## 2024-12-15 DIAGNOSIS — R05.9 COUGH: ICD-10-CM

## 2024-12-15 DIAGNOSIS — H01.114 CONTACT DERMATITIS OF UPPER AND LOWER EYELIDS OF BOTH EYES: ICD-10-CM

## 2024-12-15 DIAGNOSIS — H01.115 CONTACT DERMATITIS OF UPPER AND LOWER EYELIDS OF BOTH EYES: ICD-10-CM

## 2024-12-15 PROCEDURE — 99214 OFFICE O/P EST MOD 30 MIN: CPT | Performed by: NURSE PRACTITIONER

## 2024-12-15 PROCEDURE — 71046 X-RAY EXAM CHEST 2 VIEWS: CPT | Performed by: NURSE PRACTITIONER

## 2024-12-15 PROCEDURE — 3077F SYST BP >= 140 MM HG: CPT | Performed by: NURSE PRACTITIONER

## 2024-12-15 PROCEDURE — 3079F DIAST BP 80-89 MM HG: CPT | Performed by: NURSE PRACTITIONER

## 2024-12-15 RX ORDER — BENZONATATE 200 MG/1
200 CAPSULE ORAL 3 TIMES DAILY PRN
Qty: 42 CAPSULE | Refills: 0 | Status: SHIPPED | OUTPATIENT
Start: 2024-12-15 | End: 2025-01-14

## 2024-12-15 RX ORDER — ALBUTEROL SULFATE 90 UG/1
2 INHALANT RESPIRATORY (INHALATION) EVERY 4 HOURS PRN
Qty: 6.7 G | Refills: 0 | Status: SHIPPED | OUTPATIENT
Start: 2024-12-15 | End: 2024-12-29

## 2024-12-15 RX ORDER — IPRATROPIUM BROMIDE AND ALBUTEROL SULFATE 2.5; .5 MG/3ML; MG/3ML
3 SOLUTION RESPIRATORY (INHALATION) ONCE
Status: SHIPPED | OUTPATIENT
Start: 2024-12-15

## 2024-12-15 ASSESSMENT — ENCOUNTER SYMPTOMS
BACK PAIN: 1
VOMITING: 1
EYE ITCHING: 1
EYE DISCHARGE: 0
WHEEZING: 0
SHORTNESS OF BREATH: 0
EYE PAIN: 0
PHOTOPHOBIA: 0
RHINORRHEA: 1
NEUROLOGICAL NEGATIVE: 1
CONSTITUTIONAL NEGATIVE: 1
PSYCHIATRIC NEGATIVE: 1
EYE REDNESS: 0
COUGH: 1

## 2024-12-15 NOTE — PATIENT INSTRUCTIONS
Chest Xray negative for pneumonia, advised that a cough is always the last 2 go after illness.  Improved slightly after nebulizer treatment, should take albuterol inhaler as needed for cough, shortness of breath or wheeze.  Benzonatate also may be helpful  Rash around eyes appears to be contact dermatitis, reports to using soft soap on face, advised to use gentle soap/water  Not ordering prednisone d/t recent lumbar spinal fusion-prednisone could delay healing or predispose to infection  Can take Zyrtec-OTC, 1 by mouth, is makes you drowsy, dizzy, avoid taking  Follow up with PCP in 1 week

## 2024-12-15 NOTE — PROGRESS NOTES
Subjective   Patient ID: Janet Santillan is a 64 y.o. female. They present today with a chief complaint of Illness, Cough, and Vomiting (Was here in nov..  Got better except for cough x 7 days.).    History of Present Illness  63 yo female presents with c/o cough.  Was treated for sinus infection 11/17/24, got somewhat better but cough has continued.  Sometimes she coughs until she throws up.  Is not taking anything over the counter for it, when she took benzonatate it was helpful, but she is out.  C/O itchy rash on eyelids.  Uses regular hand soap/soft soap on face.       Illness  Associated symptoms: cough, rash, rhinorrhea and vomiting    Associated symptoms: no shortness of breath and no wheezing    Cough  Associated symptoms include postnasal drip, a rash and rhinorrhea. Pertinent negatives include no eye redness, shortness of breath or wheezing.   Vomiting  Associated symptoms: cough        Past Medical History  Allergies as of 12/15/2024 - Reviewed 12/15/2024   Allergen Reaction Noted    Bee venom protein (honey bee) Anaphylaxis and Shortness of breath 05/01/2023    Cefuroxime Hives and Unknown 05/01/2023    Duricef [cefadroxil] Hives and Unknown 05/01/2023    Penicillins Hives and Unknown 05/01/2023    Sulfa (sulfonamide antibiotics) Hives and Unknown 05/01/2023       (Not in a hospital admission)       Past Medical History:   Diagnosis Date    Hypertension     Other conditions influencing health status     No significant past medical history    PONV (postoperative nausea and vomiting)     Truncal muscle weakness 06/04/2024    Weakness of right leg 06/04/2024       Past Surgical History:   Procedure Laterality Date    CARPAL TUNNEL RELEASE Bilateral     OTHER SURGICAL HISTORY  10/28/2019    Plantar fasciotomy    OTHER SURGICAL HISTORY  10/28/2019    Facial surgery    OTHER SURGICAL HISTORY  10/28/2019    Hysterectomy total with unilateral removal of ovary    OTHER SURGICAL HISTORY  10/28/2019    Tonsillectomy     OTHER SURGICAL HISTORY  02/04/2021    Back surgery        reports that she has never smoked. She has never used smokeless tobacco. She reports current alcohol use. She reports that she does not use drugs.    Review of Systems  Review of Systems   Constitutional: Negative.    HENT:  Positive for postnasal drip and rhinorrhea.    Eyes:  Positive for itching. Negative for photophobia, pain, discharge, redness and visual disturbance.   Respiratory:  Positive for cough. Negative for shortness of breath and wheezing.    Gastrointestinal:  Positive for vomiting.   Musculoskeletal:  Positive for back pain.   Skin:  Positive for rash.   Neurological: Negative.    Psychiatric/Behavioral: Negative.                                    Objective    Vitals:    12/15/24 1803   BP: 163/83   Pulse: 89   Temp: 37 °C (98.6 °F)   SpO2: 95%     No LMP recorded. Patient has had a hysterectomy.    Physical Exam  Constitutional:       Appearance: Normal appearance.   HENT:      Right Ear: Tympanic membrane normal.      Left Ear: Tympanic membrane normal.      Nose: Congestion present.   Eyes:      Extraocular Movements: Extraocular movements intact.      Pupils: Pupils are equal, round, and reactive to light.   Cardiovascular:      Rate and Rhythm: Normal rate and regular rhythm.   Pulmonary:      Effort: Pulmonary effort is normal.      Breath sounds: Normal breath sounds.      Comments: Lungs diminished, improved after nebulizer treatment  Musculoskeletal:         General: Normal range of motion.   Skin:     General: Skin is warm and dry.      Capillary Refill: Capillary refill takes less than 2 seconds.   Neurological:      General: No focal deficit present.      Mental Status: She is alert and oriented to person, place, and time.   Psychiatric:         Mood and Affect: Mood normal.         Behavior: Behavior normal.         Thought Content: Thought content normal.         Judgment: Judgment normal.         Procedures    Point of Care  Test & Imaging Results from this visit  No results found for this visit on 12/15/24.   No results found.    Diagnostic study results (if any) were reviewed by JARRED Bradford.    Assessment/Plan   Allergies, medications, history, and pertinent labs/EKGs/Imaging reviewed by JARRED Bradford.     Medical Decision Making  Chest Xray negative for pneumonia, advised that a cough is always the last 2 go after illness.  Improved slightly after nebulizer treatment, should take albuterol inhaler as needed for cough, shortness of breath or wheeze.  Benzonatate also may be helpful  Rash around eyes appears to be contact dermatitis, reports to using soft soap on face, advised to use gentle soap/water  Not ordering prednisone d/t recent lumbar spinal fusion-prednisone could delay healing or predispose to infection  Can take Zyrtec-OTC, 1 by mouth, is makes you drowsy, dizzy, avoid taking  Follow up with PCP in 1 week    Orders and Diagnoses  Diagnoses and all orders for this visit:  Acute cough  Contact dermatitis of upper and lower eyelids of both eyes      Medical Admin Record      Patient disposition: Home    Electronically signed by JARRED Bradford  6:16 PM

## 2024-12-17 ENCOUNTER — HOSPITAL ENCOUNTER (OUTPATIENT)
Dept: RADIOLOGY | Facility: HOSPITAL | Age: 64
Discharge: HOME | End: 2024-12-17
Payer: COMMERCIAL

## 2024-12-17 DIAGNOSIS — R92.8 ABNORMAL MAMMOGRAM OF RIGHT BREAST: ICD-10-CM

## 2024-12-17 PROCEDURE — 77065 DX MAMMO INCL CAD UNI: CPT | Mod: RT

## 2024-12-17 PROCEDURE — 77065 DX MAMMO INCL CAD UNI: CPT | Mod: RIGHT SIDE | Performed by: RADIOLOGY

## 2025-01-06 ENCOUNTER — APPOINTMENT (OUTPATIENT)
Dept: OBSTETRICS AND GYNECOLOGY | Facility: CLINIC | Age: 65
End: 2025-01-06
Payer: COMMERCIAL

## 2025-02-11 ENCOUNTER — OFFICE VISIT (OUTPATIENT)
Dept: ORTHOPEDIC SURGERY | Facility: CLINIC | Age: 65
End: 2025-02-11
Payer: COMMERCIAL

## 2025-02-11 DIAGNOSIS — Z98.1 STATUS POST LUMBAR SPINAL FUSION: Primary | ICD-10-CM

## 2025-02-11 PROCEDURE — 1036F TOBACCO NON-USER: CPT | Performed by: PHYSICIAN ASSISTANT

## 2025-02-11 PROCEDURE — 99212 OFFICE O/P EST SF 10 MIN: CPT | Performed by: PHYSICIAN ASSISTANT

## 2025-02-11 NOTE — PROGRESS NOTES
HPI:  Patient comes into the office today for a wound check. Previously underwent L4-S1 Lumbar Spine Decompression Revision; Transforaminal Lumbar Interbody Fusion; Instrumentation  on 10/28/24 with Dr. Elaine.     She called the office reporting sutures protruding from a couple of the lumbar incisions that have been irritating. No wound dehiscence or drainage. No fever/chills or signs of infection.    Otherwise she is doing well with minimal low back pain.     ROS:  As noted in the HPI    Exam:  Well appearing, NAD  Pleasant & cooperative  lower extremity motor 5/5 throughout  normal gait & station    Lumbar wounds well healed without signs of infection. No palpable fluid collection, dehiscence, redness, or drainage.   Multiple small vicryl sutures protruding from the skin. Areas cleansed with alcohol swab and removed with tweezers. Areas again cleansed with alcohol swabs.     Radiology:  none    Plan:  Wound care again discussed in detail today. Offered PT referral which she decline. Plan to follow up 1 year postop as previously planned.     All questions answered. Patient in agreement to plan of care.    Tawnya Nunez PA-C  Department of Orthopaedic Surgery, Spine    [address] [place] [address]    Voicemail: [phone]   Appts: [phone]  Fax: [phone]

## 2025-04-03 ENCOUNTER — OFFICE VISIT (OUTPATIENT)
Dept: URGENT CARE | Age: 65
End: 2025-04-03
Payer: COMMERCIAL

## 2025-04-03 VITALS
DIASTOLIC BLOOD PRESSURE: 88 MMHG | HEART RATE: 63 BPM | OXYGEN SATURATION: 94 % | SYSTOLIC BLOOD PRESSURE: 156 MMHG | TEMPERATURE: 97.8 F | RESPIRATION RATE: 17 BRPM

## 2025-04-03 DIAGNOSIS — H01.112 CONTACT DERMATITIS OF UPPER AND LOWER EYELIDS OF BOTH EYES: ICD-10-CM

## 2025-04-03 DIAGNOSIS — H01.111 CONTACT DERMATITIS OF UPPER AND LOWER EYELIDS OF BOTH EYES: ICD-10-CM

## 2025-04-03 DIAGNOSIS — R09.81 NASAL CONGESTION: ICD-10-CM

## 2025-04-03 DIAGNOSIS — J01.10 ACUTE FRONTAL SINUSITIS, RECURRENCE NOT SPECIFIED: Primary | ICD-10-CM

## 2025-04-03 DIAGNOSIS — H01.115 CONTACT DERMATITIS OF UPPER AND LOWER EYELIDS OF BOTH EYES: ICD-10-CM

## 2025-04-03 DIAGNOSIS — I10 ESSENTIAL HYPERTENSION, BENIGN: ICD-10-CM

## 2025-04-03 DIAGNOSIS — H01.114 CONTACT DERMATITIS OF UPPER AND LOWER EYELIDS OF BOTH EYES: ICD-10-CM

## 2025-04-03 LAB
POC CORONAVIRUS SARS-COV-2 PCR: NEGATIVE
POC HUMAN RHINOVIRUS PCR: NEGATIVE
POC INFLUENZA A VIRUS PCR: NEGATIVE
POC INFLUENZA B VIRUS PCR: NEGATIVE
POC RESPIRATORY SYNCYTIAL VIRUS PCR: NEGATIVE

## 2025-04-03 PROCEDURE — 3077F SYST BP >= 140 MM HG: CPT | Performed by: NURSE PRACTITIONER

## 2025-04-03 PROCEDURE — 87631 RESP VIRUS 3-5 TARGETS: CPT | Performed by: NURSE PRACTITIONER

## 2025-04-03 PROCEDURE — 99214 OFFICE O/P EST MOD 30 MIN: CPT | Performed by: NURSE PRACTITIONER

## 2025-04-03 PROCEDURE — 3079F DIAST BP 80-89 MM HG: CPT | Performed by: NURSE PRACTITIONER

## 2025-04-03 RX ORDER — DOXYCYCLINE 100 MG/1
100 CAPSULE ORAL 2 TIMES DAILY
Qty: 20 CAPSULE | Refills: 0 | Status: SHIPPED | OUTPATIENT
Start: 2025-04-03 | End: 2025-04-13

## 2025-04-03 NOTE — PROGRESS NOTES
SUBJECTIVE:   Janet Santillan is a 64 y.o. female who complains of congestion, nasal blockage, post nasal drip, and headache for 7 days days. She denies a history of chest pain, dizziness, fatigue, shortness of breath, weakness, wheezing, and sputum production and denies a history of asthma. Patient denies smoke cigarettes.     OBJECTIVE:  General: Vitals noted, no distress, afebrile. Normal phonation, no stridor, no trismus  ENT: TMs bulging with cloudy effusion bilaterally, EACs unremarkable. Tender maxillary sinus.  Posterior oropharynx-mucous drainage, Uvula is in the midline and non-edematous. No Zan's angina.  Neck: Supple. No meningismus through full range of motion. No lymphadenopathy.   Cardiac: Regular rate and rhythm, no murmur.  Lungs: Good aeration throughout. No adventitious breath sounds.   Abdomen: Soft, nontender, nonsurgical throughout. Normoactive bowel sounds.   Extremities: No peripheral edema  Skin: No rash  Neuro: No focal neurologic deficits.     ASSESSMENT:   sinusitis  Contact dermatitis  Essential Hypertension    PLAN:  History and examination consistent with acute uncomplicated sinusitis. No  indication for labs or imaging at this time. No evidence of sepsis, strep pharyngitis, or  pneumonia. Counseled patient/family on antibiotic treatment (Treating with Doxycycline)and supportive measures at  home. Patient advised to return to clinic or present to ED if symptoms change or worsen.  Otherwise follow-up with PCP.   Contact dermatitis eyelids, use gentle soap, stop using scented beads in laundry, use unscented/hypoallergenic laundry soap  Elevated blood pressure.  Should monitor BP at home, if remaining >140/80 contact PCP.  If >180/80 go to ER.  Avoid products with pseudoephedrine and NSAIDS (Ibuprofen, Alleve).  Important to take routine prescribed medication

## 2025-04-03 NOTE — PATIENT INSTRUCTIONS
History and examination consistent with acute uncomplicated sinusitis. No  indication for labs or imaging at this time. No evidence of sepsis, strep pharyngitis, or  pneumonia. Counseled patient/family on antibiotic treatment and supportive measures at  home. Patient advised to return to clinic or present to ED if symptoms change or worsen.  Otherwise follow-up with PCP.   Contact dermatitis eyelids, use gentle soap, stop using scented beads in laundry, use unscented/hypoallergenic laundry soap  Elevated blood pressure.  Should monitor BP at home, if remaining >140/80 contact PCP.  If >180/80 go to ER.  Avoid products with pseudoephedrine and NSAIDS (Ibuprofen, Alleve).  Important to take routine prescribed medication

## 2025-04-11 ENCOUNTER — TELEPHONE (OUTPATIENT)
Dept: ORTHOPEDIC SURGERY | Facility: HOSPITAL | Age: 65
End: 2025-04-11
Payer: COMMERCIAL

## 2025-04-11 DIAGNOSIS — M54.16 LUMBAR RADICULOPATHY: Primary | ICD-10-CM

## 2025-04-11 RX ORDER — PREDNISONE 20 MG/1
TABLET ORAL
Qty: 30 TABLET | Refills: 0 | Status: SHIPPED | OUTPATIENT
Start: 2025-04-11 | End: 2025-04-26

## 2025-04-11 RX ORDER — GABAPENTIN 100 MG/1
100 CAPSULE ORAL 3 TIMES DAILY
Qty: 90 CAPSULE | Refills: 2 | Status: SHIPPED | OUTPATIENT
Start: 2025-04-11 | End: 2025-07-10

## 2025-04-11 NOTE — TELEPHONE ENCOUNTER
Spoke to the patient over the telephone.  History of L4-S1 lumbar spine decompression revision; TLIF with Dr. Elaine on 10/28/2024.  Having some right leg, lateral thigh, calf numbness and tingling.  This is coming and going.  Discussed normal length recovery from surgery.  Prescribed gabapentin and prednisone taper.  Follow-up as needed with us.

## 2025-05-05 ENCOUNTER — APPOINTMENT (OUTPATIENT)
Dept: PRIMARY CARE | Facility: CLINIC | Age: 65
End: 2025-05-05
Payer: COMMERCIAL

## 2025-05-23 ENCOUNTER — APPOINTMENT (OUTPATIENT)
Dept: PRIMARY CARE | Facility: CLINIC | Age: 65
End: 2025-05-23
Payer: COMMERCIAL

## 2025-05-23 VITALS
SYSTOLIC BLOOD PRESSURE: 134 MMHG | WEIGHT: 194 LBS | OXYGEN SATURATION: 96 % | HEART RATE: 64 BPM | TEMPERATURE: 96.6 F | BODY MASS INDEX: 36.66 KG/M2 | DIASTOLIC BLOOD PRESSURE: 84 MMHG

## 2025-05-23 DIAGNOSIS — M19.039 LOCALIZED PRIMARY OSTEOARTHRITIS OF WRIST, UNSPECIFIED LATERALITY: ICD-10-CM

## 2025-05-23 DIAGNOSIS — H10.33 ACUTE CONJUNCTIVITIS OF BOTH EYES, UNSPECIFIED ACUTE CONJUNCTIVITIS TYPE: ICD-10-CM

## 2025-05-23 DIAGNOSIS — R53.83 OTHER FATIGUE: ICD-10-CM

## 2025-05-23 DIAGNOSIS — Z13.6 SCREENING FOR CARDIOVASCULAR CONDITION: ICD-10-CM

## 2025-05-23 DIAGNOSIS — R92.1 BREAST CALCIFICATION, RIGHT: ICD-10-CM

## 2025-05-23 DIAGNOSIS — I10 ESSENTIAL HYPERTENSION, BENIGN: Primary | ICD-10-CM

## 2025-05-23 DIAGNOSIS — E55.9 VITAMIN D DEFICIENCY: ICD-10-CM

## 2025-05-23 DIAGNOSIS — M54.16 LUMBAR RADICULOPATHY: ICD-10-CM

## 2025-05-23 PROCEDURE — 3075F SYST BP GE 130 - 139MM HG: CPT | Performed by: INTERNAL MEDICINE

## 2025-05-23 PROCEDURE — 99214 OFFICE O/P EST MOD 30 MIN: CPT | Performed by: INTERNAL MEDICINE

## 2025-05-23 PROCEDURE — 3078F DIAST BP <80 MM HG: CPT | Performed by: INTERNAL MEDICINE

## 2025-05-23 RX ORDER — MELOXICAM 15 MG/1
15 TABLET ORAL DAILY
Qty: 90 TABLET | Refills: 1 | Status: SHIPPED | OUTPATIENT
Start: 2025-05-23

## 2025-05-23 RX ORDER — RAMIPRIL 10 MG/1
10 CAPSULE ORAL DAILY
Qty: 90 CAPSULE | Refills: 1 | Status: SHIPPED | OUTPATIENT
Start: 2025-05-23

## 2025-05-23 RX ORDER — GENTAMICIN SULFATE 3 MG/ML
2 SOLUTION/ DROPS OPHTHALMIC 4 TIMES DAILY
Qty: 5 ML | Refills: 0 | Status: SHIPPED | OUTPATIENT
Start: 2025-05-23 | End: 2025-05-30

## 2025-05-23 ASSESSMENT — ENCOUNTER SYMPTOMS
CARDIOVASCULAR NEGATIVE: 1
MUSCULOSKELETAL NEGATIVE: 1
CONSTITUTIONAL NEGATIVE: 1
HEMATOLOGIC/LYMPHATIC NEGATIVE: 1
NEUROLOGICAL NEGATIVE: 1
GASTROINTESTINAL NEGATIVE: 1
EYES NEGATIVE: 1
ENDOCRINE NEGATIVE: 1
RESPIRATORY NEGATIVE: 1
PSYCHIATRIC NEGATIVE: 1
ALLERGIC/IMMUNOLOGIC NEGATIVE: 1

## 2025-05-23 ASSESSMENT — PATIENT HEALTH QUESTIONNAIRE - PHQ9
1. LITTLE INTEREST OR PLEASURE IN DOING THINGS: NOT AT ALL
2. FEELING DOWN, DEPRESSED OR HOPELESS: NOT AT ALL
SUM OF ALL RESPONSES TO PHQ9 QUESTIONS 1 AND 2: 0

## 2025-05-23 NOTE — PROGRESS NOTES
Subjective   Patient ID: Janet Santillan is a 64 y.o. female who presents for 6 month follow up .  Patient presents in follow up regarding hypertension.  Blood pressure has been well controlled on current therapy.  No adverse effects of medication reported.  Patient denies chest pain, palpitations, shortness of breath, orthopnea, fatigue or edema.         Review of Systems   Constitutional: Negative.    HENT: Negative.     Eyes: Negative.    Respiratory: Negative.     Cardiovascular: Negative.    Gastrointestinal: Negative.    Endocrine: Negative.    Genitourinary: Negative.    Musculoskeletal: Negative.    Skin: Negative.    Allergic/Immunologic: Negative.    Neurological: Negative.    Hematological: Negative.    Psychiatric/Behavioral: Negative.         Objective     Blood pressure 134/84, pulse 64, temperature 35.9 °C (96.6 °F), temperature source Temporal, weight 88 kg (194 lb), SpO2 96%.   Physical Exam  Constitutional:       Appearance: Normal appearance.   Neck:      Vascular: No carotid bruit.   Cardiovascular:      Rate and Rhythm: Normal rate and regular rhythm.      Pulses: Normal pulses.      Heart sounds: Normal heart sounds. No murmur heard.  Pulmonary:      Effort: Pulmonary effort is normal.      Breath sounds: Normal breath sounds. No wheezing or rales.   Abdominal:      General: Abdomen is flat. There is no distension.      Palpations: Abdomen is soft.      Tenderness: There is no abdominal tenderness.   Musculoskeletal:         General: Normal range of motion.      Cervical back: Normal range of motion and neck supple.   Skin:     General: Skin is warm and dry.   Neurological:      General: No focal deficit present.      Mental Status: She is alert and oriented to person, place, and time.   Psychiatric:         Mood and Affect: Mood normal.         Behavior: Behavior normal.         Assessment/Plan   Problem List Items Addressed This Visit       Essential hypertension, benign - Primary    Relevant  Medications    ramipril (Altace) 10 mg capsule    Other Relevant Orders    CBC and Auto Differential    Localized primary osteoarthritis of wrist    Relevant Medications    meloxicam (Mobic) 15 mg tablet    Lumbar radiculopathy    Relevant Medications    meloxicam (Mobic) 15 mg tablet     Other Visit Diagnoses         Breast calcification, right        Relevant Orders    BI mammo right diagnostic      Screening for cardiovascular condition        Relevant Orders    Comprehensive Metabolic Panel    Lipid Panel      Acute conjunctivitis of both eyes, unspecified acute conjunctivitis type        Relevant Medications    gentamicin (Garamycin) 0.3 % ophthalmic solution      Other fatigue        Relevant Orders    TSH with reflex to Free T4 if abnormal    CBC and Auto Differential      Vitamin D deficiency        Relevant Orders    Vitamin D 25-Hydroxy,Total (for eval of Vitamin D levels)          BP well controlled on current therapy.  Will continue present therapy and follow.  Arthritis pain well compensated on current therapy.  Will continue present therapy and follow.  Patient underwent spine surgery per Dr. Elaine on 10/28 and is doing very well.  She is encouraged and advised to continue improving dietary choices and portion control as well as increasing exercise to promote weight loss.   She has c/o her eyes being puffy and itching with morning exudate that has been going on for several weeks.  Will empirically treat with antibiotic and if not improved, she is advised to see her eye doctor.  She also c/o of being tired all of the time.  She states she is not rested in the mornings and can nap frequently during the day.  No other associated issues.  Will check pertinent lab first and if normal, proceed to sleep study.  Annual mammogram in December revealed calcifications in the right breast which were interpreted as benign, but repeat study was recommended in 6 months.  Order entered.  Annual screening lab is  ordered for next visit.      Follow up in 6 months   Lab to be drawn 1 week prior to next office visit.

## 2025-05-24 LAB
25(OH)D3+25(OH)D2 SERPL-MCNC: 36 NG/ML (ref 30–100)
BASOPHILS # BLD AUTO: 32 CELLS/UL (ref 0–200)
BASOPHILS NFR BLD AUTO: 0.4 %
EOSINOPHIL # BLD AUTO: 103 CELLS/UL (ref 15–500)
EOSINOPHIL NFR BLD AUTO: 1.3 %
ERYTHROCYTE [DISTWIDTH] IN BLOOD BY AUTOMATED COUNT: 13.6 % (ref 11–15)
HCT VFR BLD AUTO: 44.2 % (ref 35–45)
HGB BLD-MCNC: 13.9 G/DL (ref 11.7–15.5)
LYMPHOCYTES # BLD AUTO: 3903 CELLS/UL (ref 850–3900)
LYMPHOCYTES NFR BLD AUTO: 49.4 %
MCH RBC QN AUTO: 27.2 PG (ref 27–33)
MCHC RBC AUTO-ENTMCNC: 31.4 G/DL (ref 32–36)
MCV RBC AUTO: 86.5 FL (ref 80–100)
MONOCYTES # BLD AUTO: 782 CELLS/UL (ref 200–950)
MONOCYTES NFR BLD AUTO: 9.9 %
NEUTROPHILS # BLD AUTO: 3081 CELLS/UL (ref 1500–7800)
NEUTROPHILS NFR BLD AUTO: 39 %
PLATELET # BLD AUTO: 204 THOUSAND/UL (ref 140–400)
PMV BLD REES-ECKER: 11.3 FL (ref 7.5–12.5)
RBC # BLD AUTO: 5.11 MILLION/UL (ref 3.8–5.1)
TSH SERPL-ACNC: 1.16 MIU/L (ref 0.4–4.5)
WBC # BLD AUTO: 7.9 THOUSAND/UL (ref 3.8–10.8)

## 2025-05-28 ENCOUNTER — TELEPHONE (OUTPATIENT)
Dept: PRIMARY CARE | Facility: CLINIC | Age: 65
End: 2025-05-28
Payer: COMMERCIAL

## 2025-05-28 DIAGNOSIS — R51.9 MORNING HEADACHE: ICD-10-CM

## 2025-05-28 DIAGNOSIS — R40.0 DAYTIME SOMNOLENCE: Primary | ICD-10-CM

## 2025-05-28 DIAGNOSIS — I10 ESSENTIAL HYPERTENSION, BENIGN: ICD-10-CM

## 2025-05-28 DIAGNOSIS — R53.83 OTHER FATIGUE: ICD-10-CM

## 2025-05-28 NOTE — TELEPHONE ENCOUNTER
Janet called back.  I gave her your instructions.  She wanted to know why the CBC and Auto Differential was abnormal.

## 2025-06-16 DIAGNOSIS — M25.551 RIGHT HIP PAIN: ICD-10-CM

## 2025-06-16 DIAGNOSIS — M25.561 RIGHT KNEE PAIN, UNSPECIFIED CHRONICITY: ICD-10-CM

## 2025-06-17 ENCOUNTER — HOSPITAL ENCOUNTER (OUTPATIENT)
Dept: RADIOLOGY | Facility: HOSPITAL | Age: 65
Discharge: HOME | End: 2025-06-17
Payer: COMMERCIAL

## 2025-06-17 ENCOUNTER — OFFICE VISIT (OUTPATIENT)
Dept: ORTHOPEDIC SURGERY | Facility: HOSPITAL | Age: 65
End: 2025-06-17
Payer: COMMERCIAL

## 2025-06-17 VITALS — BODY MASS INDEX: 36.63 KG/M2 | HEIGHT: 61 IN | WEIGHT: 194 LBS

## 2025-06-17 DIAGNOSIS — R52 PAIN: ICD-10-CM

## 2025-06-17 DIAGNOSIS — M25.561 RIGHT KNEE PAIN, UNSPECIFIED CHRONICITY: ICD-10-CM

## 2025-06-17 DIAGNOSIS — M48.062 LUMBAR STENOSIS WITH NEUROGENIC CLAUDICATION: Primary | ICD-10-CM

## 2025-06-17 DIAGNOSIS — M25.551 RIGHT HIP PAIN: ICD-10-CM

## 2025-06-17 DIAGNOSIS — R92.1 BREAST CALCIFICATION, RIGHT: ICD-10-CM

## 2025-06-17 PROCEDURE — 77065 DX MAMMO INCL CAD UNI: CPT | Mod: RIGHT SIDE

## 2025-06-17 PROCEDURE — 77061 BREAST TOMOSYNTHESIS UNI: CPT | Mod: RT

## 2025-06-17 PROCEDURE — 77061 BREAST TOMOSYNTHESIS UNI: CPT | Mod: RIGHT SIDE

## 2025-06-17 PROCEDURE — 73562 X-RAY EXAM OF KNEE 3: CPT | Mod: RT

## 2025-06-17 PROCEDURE — 73502 X-RAY EXAM HIP UNI 2-3 VIEWS: CPT | Mod: RIGHT SIDE | Performed by: RADIOLOGY

## 2025-06-17 PROCEDURE — 99212 OFFICE O/P EST SF 10 MIN: CPT | Performed by: SPECIALIST

## 2025-06-17 PROCEDURE — 3008F BODY MASS INDEX DOCD: CPT | Performed by: SPECIALIST

## 2025-06-17 PROCEDURE — 73560 X-RAY EXAM OF KNEE 1 OR 2: CPT | Mod: LT

## 2025-06-17 PROCEDURE — 73502 X-RAY EXAM HIP UNI 2-3 VIEWS: CPT | Mod: RT

## 2025-06-17 PROCEDURE — 99214 OFFICE O/P EST MOD 30 MIN: CPT | Performed by: SPECIALIST

## 2025-06-17 ASSESSMENT — ENCOUNTER SYMPTOMS
OCCASIONAL FEELINGS OF UNSTEADINESS: 1
DEPRESSION: 0
LOSS OF SENSATION IN FEET: 0

## 2025-06-17 NOTE — PROGRESS NOTES
NPV Right leg Numbness   XR Done today     Patient having numbness since March she was walking and states the leg/knee felt like it was going to give out.Patient had lumbar surgery in October 2024.  She called the spine surgery office concerned that it was some ongoing sciatica, and they suggested the patient see me to check out the right hip and knee.  She certainly describes what sounds like lumbar radiculopathy.    Exam: Patient alert and oriented x 3 no acute distress.  She has mildly positive straight leg raise on the right 4 out of 5 strength to ankle dorsiflexion and eversion.  Full passive range of motion of the right hip and knee without significant pain.  Only minimal medial joint space pain to the right knee.  She does have some light touch sensory changes to the lateral right leg dorsal right foot.    Radiographs right hip and right knee show only mild degenerative changes.  No other acute abnormality.    Assessment plan: Right lumbar radiculopathy.  She does have mild knee arthritis but her pain is not suggestive of her knee DJD.  She mostly wanted reassurance today that everything checked out she will follow-up with the spine surgeon.

## 2025-07-01 ENCOUNTER — APPOINTMENT (OUTPATIENT)
Dept: SLEEP MEDICINE | Facility: CLINIC | Age: 65
End: 2025-07-01
Payer: COMMERCIAL

## 2025-08-13 DIAGNOSIS — M54.16 LUMBAR RADICULOPATHY: ICD-10-CM

## 2025-08-14 RX ORDER — GABAPENTIN 100 MG/1
100 CAPSULE ORAL DAILY
Qty: 90 CAPSULE | Refills: 2 | Status: SHIPPED | OUTPATIENT
Start: 2025-08-14

## 2025-11-19 ENCOUNTER — APPOINTMENT (OUTPATIENT)
Dept: PRIMARY CARE | Facility: CLINIC | Age: 65
End: 2025-11-19
Payer: COMMERCIAL

## (undated) DEVICE — Device

## (undated) DEVICE — ADHESIVE, SKIN, MASTISOL, 2/3 CC VIAL

## (undated) DEVICE — GLOVE, SURGICAL, PROTEXIS PI ORTHO, 8.0, PF, LF

## (undated) DEVICE — BUR, 3MM PRECISION MATCH HEAD, 16CM

## (undated) DEVICE — KIT, PATIENT CARE, JACKSON TABLE W/PRONE-SAFE HEADREST

## (undated) DEVICE — ELECTRODE, ELECTROSURGICAL, BLADE, INSULATED, ENT/IMA, STERILE

## (undated) DEVICE — SUTURE, PDS II, 0, 27 IN, CT1, VIOLET

## (undated) DEVICE — GLOVE, SURGICAL, PROTEXIS PI MICRO, 7.5, PF, LF

## (undated) DEVICE — NEEDLE, BIOPSY, ASPIRATION, BONE MARROW, JAMSHIDI, W/LUER LOCK ADAPTER, 11 G X 4 IN

## (undated) DEVICE — SUTURE, PDS II, 2-0, 27 IN, SH, VIOLET

## (undated) DEVICE — ADHESIVE, SKIN, DERMABOND ADVANCED, 15CM, PEN-STYLE

## (undated) DEVICE — STRIP, SKIN CLOSURE, STERI STRIP, REINFORCED, 0.5 X 4 IN

## (undated) DEVICE — SEALANT, DURASEAL EXACT, 5ML

## (undated) DEVICE — STYLET, BLUNT INNER

## (undated) DEVICE — PREP TRAY, VAGINAL

## (undated) DEVICE — SPONGE, LAP, XRAY DECT, 4IN X 18IN, W/MASTER DMT, STERILE

## (undated) DEVICE — DRAPE, MICROSCOPE, FOR ZEISS 65MM, VARI-LENS II, 52 X 150

## (undated) DEVICE — CARTRIDGE, MAESTRO OIL, CORE

## (undated) DEVICE — TOWEL, SURGICAL, NEURO, O/R, 16 X 26, BLUE, STERILE

## (undated) DEVICE — DIFFUSER, MAESTRO, CORE

## (undated) DEVICE — DRAPE, INCISE, ANTIMICROBIAL, IOBAN 2, STERI DRAPE, 23 X 33 IN, DISPOSABLE, STERILE

## (undated) DEVICE — COVER, C-ARM W/CLIPS, OEC GE

## (undated) DEVICE — TIP,  ELECTRODE COATED INSULATED, EXTENDED, LF

## (undated) DEVICE — SUTURE, ETHILON, 2-0, FSLX 30, BLACK

## (undated) DEVICE — DRAPE COVER, C ARM, FLOUROSCAN IMAGING SYS

## (undated) DEVICE — EXTENDER, SV TAB, 5.5/6.0